# Patient Record
Sex: MALE | Race: BLACK OR AFRICAN AMERICAN | NOT HISPANIC OR LATINO | Employment: FULL TIME | URBAN - METROPOLITAN AREA
[De-identification: names, ages, dates, MRNs, and addresses within clinical notes are randomized per-mention and may not be internally consistent; named-entity substitution may affect disease eponyms.]

---

## 2017-09-12 ENCOUNTER — OFFICE VISIT (OUTPATIENT)
Dept: SPINE | Facility: CLINIC | Age: 37
End: 2017-09-12
Payer: COMMERCIAL

## 2017-09-12 VITALS
HEART RATE: 75 BPM | DIASTOLIC BLOOD PRESSURE: 81 MMHG | SYSTOLIC BLOOD PRESSURE: 140 MMHG | WEIGHT: 252.13 LBS | HEIGHT: 71 IN | BODY MASS INDEX: 35.3 KG/M2

## 2017-09-12 DIAGNOSIS — M54.41 CHRONIC BILATERAL LOW BACK PAIN WITH BILATERAL SCIATICA: Primary | ICD-10-CM

## 2017-09-12 DIAGNOSIS — G89.29 CHRONIC BILATERAL LOW BACK PAIN WITH BILATERAL SCIATICA: Primary | ICD-10-CM

## 2017-09-12 DIAGNOSIS — M54.42 CHRONIC BILATERAL LOW BACK PAIN WITH BILATERAL SCIATICA: Primary | ICD-10-CM

## 2017-09-12 DIAGNOSIS — M51.26 HERNIATED LUMBAR INTERVERTEBRAL DISC: ICD-10-CM

## 2017-09-12 DIAGNOSIS — E66.9 OBESITY, UNSPECIFIED OBESITY SEVERITY, UNSPECIFIED OBESITY TYPE: ICD-10-CM

## 2017-09-12 PROCEDURE — 3008F BODY MASS INDEX DOCD: CPT | Mod: S$GLB,,, | Performed by: PHYSICIAN ASSISTANT

## 2017-09-12 PROCEDURE — 99999 PR PBB SHADOW E&M-NEW PATIENT-LVL III: CPT | Mod: PBBFAC,,, | Performed by: PHYSICIAN ASSISTANT

## 2017-09-12 PROCEDURE — 99204 OFFICE O/P NEW MOD 45 MIN: CPT | Mod: S$GLB,,, | Performed by: PHYSICIAN ASSISTANT

## 2017-09-12 RX ORDER — LISINOPRIL AND HYDROCHLOROTHIAZIDE 10; 12.5 MG/1; MG/1
TABLET ORAL
COMMUNITY
Start: 2017-09-06 | End: 2017-10-04
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
## 2017-10-04 ENCOUNTER — OFFICE VISIT (OUTPATIENT)
Dept: PAIN MEDICINE | Facility: CLINIC | Age: 37
End: 2017-10-04
Payer: COMMERCIAL

## 2017-10-04 VITALS
BODY MASS INDEX: 34.57 KG/M2 | DIASTOLIC BLOOD PRESSURE: 68 MMHG | HEART RATE: 86 BPM | RESPIRATION RATE: 20 BRPM | SYSTOLIC BLOOD PRESSURE: 112 MMHG | WEIGHT: 246.94 LBS | HEIGHT: 71 IN

## 2017-10-04 DIAGNOSIS — M53.3 SACROILIAC DYSFUNCTION: ICD-10-CM

## 2017-10-04 DIAGNOSIS — M47.27 OSTEOARTHRITIS OF SPINE WITH RADICULOPATHY, LUMBOSACRAL REGION: ICD-10-CM

## 2017-10-04 DIAGNOSIS — M54.16 LUMBAR RADICULOPATHY: Primary | ICD-10-CM

## 2017-10-04 DIAGNOSIS — E66.9 OBESITY (BMI 30-39.9): ICD-10-CM

## 2017-10-04 DIAGNOSIS — M51.36 DDD (DEGENERATIVE DISC DISEASE), LUMBAR: ICD-10-CM

## 2017-10-04 PROBLEM — M51.369 DDD (DEGENERATIVE DISC DISEASE), LUMBAR: Status: ACTIVE | Noted: 2017-10-04

## 2017-10-04 PROCEDURE — 99244 OFF/OP CNSLTJ NEW/EST MOD 40: CPT | Mod: S$GLB,,, | Performed by: PAIN MEDICINE

## 2017-10-04 PROCEDURE — 99999 PR PBB SHADOW E&M-EST. PATIENT-LVL III: CPT | Mod: PBBFAC,,, | Performed by: PAIN MEDICINE

## 2017-10-04 RX ORDER — TIZANIDINE 2 MG/1
4 TABLET ORAL NIGHTLY PRN
Qty: 60 TABLET | Refills: 1 | Status: SHIPPED | OUTPATIENT
Start: 2017-10-04 | End: 2017-10-14

## 2017-10-04 RX ORDER — GABAPENTIN 300 MG/1
CAPSULE ORAL
Qty: 90 CAPSULE | Refills: 1 | Status: SHIPPED | OUTPATIENT
Start: 2017-10-04 | End: 2021-12-16 | Stop reason: SDUPTHER

## 2017-10-04 RX ORDER — LOSARTAN POTASSIUM AND HYDROCHLOROTHIAZIDE 25; 100 MG/1; MG/1
0.5 TABLET ORAL DAILY
Refills: 1 | COMMUNITY
Start: 2017-09-12 | End: 2023-06-07 | Stop reason: SDUPTHER

## 2017-10-04 NOTE — PATIENT INSTRUCTIONS
Understanding Lumbar Radiculopathy    Lumbar radiculopathy is irritation or inflammation of a nerve root in the low back. It causes symptoms that spread out from the back down one or both legs. To understand this condition, it helps to understand the parts of the spine:  · Vertebrae. These are bones that stack to form the spine. The lumbar spine contains the 5 bottom vertebrae.  · Disks. These are soft pads of tissue between the vertebrae. They act as shock absorbers for the spine.  · Spinal canal. This is a tunnel formed within the stacked vertebrae. In the lumbar spine, nerves run through this canal.  · Nerves. These branch off and leave the spinal canal, traveling out to parts of the body. As they leave the spinal canal, nerves pass through openings between the vertebrae. The nerve root is the part of the nerve that is closest to the spinal canal.  · Sciatic nerve. This is a large nerve formed from several nerve roots in the low back. This nerve extends down the back of the leg to the foot.  With lumbar radiculopathy, nerve roots in the low back become irritated. This leads to pain and symptoms. The sciatic nerve is commonly involved, so the condition is often called sciatica.  What causes lumbar radiculopathy?  Aging, injury, poor posture, extra body weight, and other issues can lead to problems in the low back. These problems may then irritate nerve roots. They include:  · Damage to a disk in the lumbar spine. The damaged disk may then press on nearby nerve roots.  · Degeneration from wear and tear, and aging. This can lead to narrowing (stenosis) of the openings between the vertebrae. The narrowed openings press on nerve roots as they leave the spinal canal.  · Unstable spine. This is when a vertebra slips forward. It can then press on a nerve root.  Other, less common things can put pressure on nerves in the low back. These include diabetes, infection, or a tumor.  Symptoms of lumbar radiculopathy  These  include:  · Pain in the low back  · Pain, numbness, tingling, or weakness that travels into the buttocks, hip, groin, or leg  · Muscle spasms  Treatment for lumbar radiculopathy  In most cases, your healthcare provider will first try treatments that help relieve symptoms. These may include:  · Prescription and over-the-counter pain medicines. These help relieve pain, swelling, and irritation.  · Limits on positions and activities that increase pain. But lying in bed or avoiding all movement is only recommended for a short period of time.  · Physical therapy, including exercises and stretches. This helps decrease pain and increase movement and function.  · Steroid shots into the lower back. This may help relieve symptoms for a time.  · Weight-loss program. If you are overweight, losing extra pounds may help relieve symptoms.  In some cases, you may need surgery to fix the underlying problem. This depends on the cause, the symptoms, and how long the pain has lasted.  Possible complications  Over time, an irritated and inflamed nerve may become damaged. This may lead to long-lasting (permanent) numbness or weakness in your legs and feet. If symptoms change suddenly or get worse, be sure to let your healthcare provider know.  When to call your healthcare provider  Call your healthcare provider right away if you have any of these:  · New pain or pain that gets worse  · New or increasing weakness, tingling, or numbness in your leg or foot  · Problems controlling your bladder or bowel   Date Last Reviewed: 3/10/2016  © 9786-7188 [employer]. [address] [place] [address], [place] [address]. All rights reserved. This information is not intended as a substitute for professional medical care. Always follow your healthcare professional's instructions.        Lumbar Epidural Injection: Recovery at Home    After a lumbar epidural injection, you dont need to stay in bed when you get home. In fact, its best to walk around  if you feel up to it. Just be careful about being too active. Even if you feel better right away, avoid activities that may strain your back. And follow up on all treatment with your doctor.  What to know about pain relief  Keep in mind that some people feel more pain at first. It usually goes away within a few days. You may also have headaches or trouble sleeping, but if these symptoms are severe, you should call your doctor right away. These should also go away within a few days. In general:  · An injection to reduce inflammation takes a few days to work, sometimes even up to a week. There may even be more pain at first.  · An injection to help locate the source of pain may give only brief pain relief. Later, youll feel the same as you did before the injection.  Tips for recovery  Whether you were injected for pain relief or diagnosis, these tips will help you recover:  · Take walks when you feel up to it.  · Rest if needed, but get up and move around after sitting for half an hour.  · Dont exercise vigorously.  · Dont drive the day of the procedure or until your doctor says its OK.  · Return to work or other activities when your doctor says youre ready.  When to call your doctor  Call right away if you notice any of the following symptoms:  · Severe pain or headache  · Loss of bladder or bowel control  · Fever or chills  · Redness or swelling around the injection site       Date Last Reviewed: 11/1/2015  © 2371-9021 [employer]. [address] [place] [address], [place] [address]. All rights reserved. This information is not intended as a substitute for professional medical care. Always follow your healthcare professional's instructions.
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
## 2017-10-04 NOTE — LETTER
October 4, 2017      Skyla Shah PA-C  [address] [facility] [address]  [address]  [facility] [address]           [facility]  [address] [facility] [address] [place] [address]  Phone: [phone]          Patient: Lane Campbell   MR Number: [id number]   YOB: 1980   Date of Visit: 10/4/2017       Dear Skyla Shah:    Thank you for referring Lane Campbell to me for evaluation. Attached you will find relevant portions of my assessment and plan of care.    If you have questions, please do not hesitate to call me. I look forward to following Lane Campbell along with you.    Sincerely,    Kassie Mistry Jr., MD    Enclosure  CC:  No Recipients    If you would like to receive this communication electronically, please contact [email or web address] or [phone] to request more information on [facility] Link access.    For providers and/or their staff who would like to refer a patient to [facility], please contact us through our one-stop-shop provider referral line, Janina Fisher, at [phone].    If you feel you have received this communication in error or would no longer like to receive these types of communications, please e-mail [email or web address]

## 2017-10-10 ENCOUNTER — TELEPHONE (OUTPATIENT)
Dept: PAIN MEDICINE | Facility: CLINIC | Age: 37
End: 2017-10-10

## 2017-10-10 NOTE — TELEPHONE ENCOUNTER
Spoke to billing at [facility] and outpatient surgery scheduling at [facility], all of whom have been unable to contact patient about his procedure tomorrow 10/11/17 and payment intentions. After discussing this with Dr Mistry this procedure has been postponed and we will attempt to proceed with the scheduled procedure on 10/25, pending response from patient.

## 2017-10-10 NOTE — TELEPHONE ENCOUNTER
Called patient concerning his procedure for tomorrow to inform him the surgery center is trying to get in touch with him. No answer, Called his mother and she states she will try and get in touch with him this afternoon.

## 2019-10-08 ENCOUNTER — TELEPHONE (OUTPATIENT)
Dept: NEUROSURGERY | Facility: CLINIC | Age: 39
End: 2019-10-08

## 2019-10-08 NOTE — TELEPHONE ENCOUNTER
Called both patient and mother regarding rescheduled appointment. No answer. Unable to leave VM with patient. LVM with mother.

## 2019-11-06 ENCOUNTER — OFFICE VISIT (OUTPATIENT)
Dept: NEUROSURGERY | Facility: CLINIC | Age: 39
End: 2019-11-06
Payer: COMMERCIAL

## 2019-11-06 ENCOUNTER — TELEPHONE (OUTPATIENT)
Dept: PAIN MEDICINE | Facility: HOSPITAL | Age: 39
End: 2019-11-06

## 2019-11-06 VITALS
DIASTOLIC BLOOD PRESSURE: 101 MMHG | SYSTOLIC BLOOD PRESSURE: 156 MMHG | BODY MASS INDEX: 38.4 KG/M2 | WEIGHT: 274.25 LBS | HEART RATE: 77 BPM | HEIGHT: 71 IN

## 2019-11-06 DIAGNOSIS — M51.26 LUMBAR DISC HERNIATION: Primary | ICD-10-CM

## 2019-11-06 DIAGNOSIS — M54.50 LOW BACK PAIN, NON-SPECIFIC: ICD-10-CM

## 2019-11-06 DIAGNOSIS — M54.16 RADICULOPATHY OF LUMBAR REGION: ICD-10-CM

## 2019-11-06 PROCEDURE — 99214 OFFICE O/P EST MOD 30 MIN: CPT | Mod: S$GLB,,, | Performed by: PHYSICIAN ASSISTANT

## 2019-11-06 PROCEDURE — 99999 PR PBB SHADOW E&M-EST. PATIENT-LVL III: CPT | Mod: PBBFAC,,, | Performed by: PHYSICIAN ASSISTANT

## 2019-11-06 PROCEDURE — 99214 PR OFFICE/OUTPT VISIT, EST, LEVL IV, 30-39 MIN: ICD-10-PCS | Mod: S$GLB,,, | Performed by: PHYSICIAN ASSISTANT

## 2019-11-06 PROCEDURE — 3008F PR BODY MASS INDEX (BMI) DOCUMENTED: ICD-10-PCS | Mod: CPTII,S$GLB,, | Performed by: PHYSICIAN ASSISTANT

## 2019-11-06 PROCEDURE — 99999 PR PBB SHADOW E&M-EST. PATIENT-LVL III: ICD-10-PCS | Mod: PBBFAC,,, | Performed by: PHYSICIAN ASSISTANT

## 2019-11-06 PROCEDURE — 3008F BODY MASS INDEX DOCD: CPT | Mod: CPTII,S$GLB,, | Performed by: PHYSICIAN ASSISTANT

## 2019-11-06 NOTE — TELEPHONE ENCOUNTER
----- Message from Yamileth Reese PA-C sent at 11/6/2019 12:00 PM CST -----  Hello,    This nice gentleman saw Dr. Aceves and I today. He has a large L4/L5 disc that may need surgery, but only mild weakness on exam. We are repeating his MRI and going to see him in a month, but could you schedule him for an L4-L5 translaminar (bilateral) MAXIMUS with 1st available please?     He is also very anxious about the needles. I discussed that there will be some sedative, but if you think it would be better for me to call in something for him ahead of time, please let me know. I just didn't want him to be over sedated.     Thanks so much! Please let me know if you need anything on our end  -Linda
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
## 2019-11-06 NOTE — TELEPHONE ENCOUNTER
Please set up patient for bilateral L4/5 TFESI ASAP and place orders for IV, follow up 2 weeks after procedure. Thanks!

## 2019-11-08 DIAGNOSIS — M54.16 LUMBAR RADICULOPATHY: Primary | ICD-10-CM

## 2019-11-08 RX ORDER — ALPRAZOLAM 0.5 MG/1
1 TABLET, ORALLY DISINTEGRATING ORAL ONCE AS NEEDED
Status: CANCELLED | OUTPATIENT
Start: 2019-11-13 | End: 2031-04-10

## 2019-11-08 NOTE — TELEPHONE ENCOUNTER
Spoke with patient and the TF MAXIMUS has been scheduled for 11/13. Pre-op instructions were reviewed and sent to patient.

## 2019-11-11 ENCOUNTER — TELEPHONE (OUTPATIENT)
Dept: NEUROSURGERY | Facility: CLINIC | Age: 39
End: 2019-11-11

## 2019-11-11 NOTE — TELEPHONE ENCOUNTER
Pt called our office asking to be rescheduled for injection scheduled 11/13/19. He says he is not feeling well and has body aches. Please return call. Thanks!

## 2019-11-12 ENCOUNTER — TELEPHONE (OUTPATIENT)
Dept: SURGERY | Facility: HOSPITAL | Age: 39
End: 2019-11-12

## 2019-11-12 NOTE — TELEPHONE ENCOUNTER
While attempting to do pre-op phone call, patient states he already called to cancel procedure scheduled for 11/13/19, due to feeling under the weather. Patient removed from schedule, please call to reschedule.

## 2019-11-25 ENCOUNTER — TELEPHONE (OUTPATIENT)
Dept: PAIN MEDICINE | Facility: CLINIC | Age: 39
End: 2019-11-25

## 2019-11-25 NOTE — TELEPHONE ENCOUNTER
----- Message from Paris Parker sent at 11/21/2019  9:33 AM CST -----  Contact: [phone]  Patient is returning nurse's phone call.  Please call patient back at [phone].

## 2019-12-06 ENCOUNTER — TELEPHONE (OUTPATIENT)
Dept: NEUROSURGERY | Facility: CLINIC | Age: 39
End: 2019-12-06

## 2019-12-06 NOTE — TELEPHONE ENCOUNTER
Pt called requesting to schedule MAXIMUS with Dr Rodriguez. I told him it was documented that you tried to call but unable to leave voicemail. He said he doesn't have good reception. The best time to call him is around 3:00 or later in the day. Thank you!

## 2019-12-09 ENCOUNTER — TELEPHONE (OUTPATIENT)
Dept: NEUROSURGERY | Facility: CLINIC | Age: 39
End: 2019-12-09

## 2019-12-09 NOTE — TELEPHONE ENCOUNTER
SAMUEL attempted to call pt to inform of appt with pain management scheduled on 12/27/19. No voicemail available.

## 2019-12-27 ENCOUNTER — OFFICE VISIT (OUTPATIENT)
Dept: PAIN MEDICINE | Facility: CLINIC | Age: 39
End: 2019-12-27
Payer: COMMERCIAL

## 2019-12-27 VITALS
SYSTOLIC BLOOD PRESSURE: 141 MMHG | DIASTOLIC BLOOD PRESSURE: 62 MMHG | TEMPERATURE: 97 F | BODY MASS INDEX: 39.28 KG/M2 | WEIGHT: 281.63 LBS | RESPIRATION RATE: 20 BRPM | OXYGEN SATURATION: 99 % | HEART RATE: 82 BPM

## 2019-12-27 DIAGNOSIS — M51.36 DDD (DEGENERATIVE DISC DISEASE), LUMBAR: ICD-10-CM

## 2019-12-27 DIAGNOSIS — M54.16 LUMBAR RADICULOPATHY: Primary | ICD-10-CM

## 2019-12-27 PROCEDURE — 99213 OFFICE O/P EST LOW 20 MIN: CPT | Mod: S$GLB,,, | Performed by: PHYSICIAN ASSISTANT

## 2019-12-27 PROCEDURE — 99213 PR OFFICE/OUTPT VISIT, EST, LEVL III, 20-29 MIN: ICD-10-PCS | Mod: S$GLB,,, | Performed by: PHYSICIAN ASSISTANT

## 2019-12-27 PROCEDURE — 99999 PR PBB SHADOW E&M-EST. PATIENT-LVL IV: ICD-10-PCS | Mod: PBBFAC,,, | Performed by: PHYSICIAN ASSISTANT

## 2019-12-27 PROCEDURE — 99999 PR PBB SHADOW E&M-EST. PATIENT-LVL IV: CPT | Mod: PBBFAC,,, | Performed by: PHYSICIAN ASSISTANT

## 2019-12-27 PROCEDURE — 3008F BODY MASS INDEX DOCD: CPT | Mod: CPTII,S$GLB,, | Performed by: PHYSICIAN ASSISTANT

## 2019-12-27 PROCEDURE — 3008F PR BODY MASS INDEX (BMI) DOCUMENTED: ICD-10-PCS | Mod: CPTII,S$GLB,, | Performed by: PHYSICIAN ASSISTANT

## 2019-12-27 RX ORDER — ALPRAZOLAM 0.5 MG/1
1 TABLET, ORALLY DISINTEGRATING ORAL ONCE AS NEEDED
Status: CANCELLED | OUTPATIENT
Start: 2020-01-17 | End: 2031-06-14

## 2019-12-29 NOTE — PROGRESS NOTES
This note was completed with dictation software and grammatical errors may exist.    CC:  Low back and bilateral leg pain    HPI:  The patient is a 39-year-old male with past medical history significant for hypertension who presents in referral from Yamileth Reese PA-C for low back and bilateral leg pain. The patient is new to me but has seen Dr. Mistry in the past.  This was over 2 years ago and it was recommended that he undergo epidural steroid injections at that time.  However, he has not had this done and has been living with the pain. He has tried physical therapy and medication without significant relief.  The patient describes deep pain across the low back radiating to the bilateral buttocks, lateral thighs, lateral shins and entire right foot.  The left leg pain is worse than the right leg pain.  It is worse with sitting too long and then getting up.  He does have some improvement with hot water.  He denies having any weakness, numbness, bladder or bowel incontinence.    Pain intervention history:  He has tried physical therapy without sufficient relief.  He is taking gabapentin with mild relief.    ROS:  The patient reports back pain.  Balance of review of systems is negative.    Past Medical History:   Diagnosis Date    Diabetes mellitus     pre diabetic    H/O bronchitis     Hypertension        Past Surgical History:   Procedure Laterality Date    FRACTURE SURGERY      left thumb       Social History     Socioeconomic History    Marital status: Single     Spouse name: Not on file    Number of children: Not on file    Years of education: Not on file    Highest education level: Not on file   Occupational History    Not on file   Social Needs    Financial resource strain: Not on file    Food insecurity:     Worry: Not on file     Inability: Not on file    Transportation needs:     Medical: Not on file     Non-medical: Not on file   Tobacco Use    Smoking status: Never Smoker     Smokeless tobacco: Never Used   Substance and Sexual Activity    Alcohol use: Yes     Comment: occaisonal    Drug use: No    Sexual activity: Never   Lifestyle    Physical activity:     Days per week: Not on file     Minutes per session: Not on file    Stress: Not on file   Relationships    Social connections:     Talks on phone: Not on file     Gets together: Not on file     Attends [employer] service: Not on file     Active member of club or organization: Not on file     Attends meetings of clubs or organizations: Not on file     Relationship status: Not on file   Other Topics Concern    Not on file   Social History Narrative    Not on file         Medications/Allergies: See med card    Vitals:    12/27/19 1449   BP: (!) 141/62   Pulse: 82   Resp: 20   Temp: 97.4 °F (36.3 °C)   TempSrc: Oral   SpO2: 99%   Weight: 127.7 kg (281 lb 10.2 oz)   PainSc:   6   PainLoc: Back         Physical exam:  Gen: A and O x3, pleasant, well-groomed  Skin: No rashes or obvious lesions  HEENT: PERRLA, no obvious deformities on ears or in canals. Trachea midline.  CVS: Regular rate and rhythm, normal palpable pulses.  Resp:No increased work of breathing, symmetrical chest rise.  Abdomen: Soft, NT/ND.  Musculoskeletal: Able to heel walk, toe walk. No antalgic gait.     Neuro:  Lower extremities: 5/5 strength bilaterally  Reflexes: Patellar 2+, Achilles 2+ bilaterally.  Sensory:  Intact and symmetrical to light touch and pinprick in L2-S1 dermatomes bilaterally.    Lumbar spine:  Lumbar spine: ROM is full with flexion extension and oblique extension with increased low back pain especially with flexion.   Chino's test causes no increased pain on either side.    Supine straight leg raise causes left greater than right buttock and lateral thigh pain.  Internal and external rotation of the hip causes no increased pain on either side.  Myofascial exam: No tenderness to palpation across lumbar paraspinous  muscles.      Imagin2019 MRI lumbar spine per Neurosurgery notes  There is a large left worse than right disc herniation causing severe left lateral recess and foraminal stenosis at L4-5. There is mild facet arthropathy.    Assessment:  The patient is a 39-year-old male with past medical history significant for hypertension who presents in referral from Yamileth Reese PA-C for low back and bilateral leg pain.     1. Lumbar radiculopathy  Vital signs    Verify informed consent    Notify physician     Notify physician     Notify physician (specify)    Diet NPO    Case Request Operating Room: Injection,steroid,epidural,transforaminal approach L4/5    Place in Outpatient    alprazolam ODT dissolvable tablet 1 mg   2. DDD (degenerative disc disease), lumbar         Plan:  1.  We discussed the patient's MRI findings and his symptoms.  He has seen Neurosurgery and it was recommended that he try MAXIMUS prior to any surgical intervention.  I will schedule patient for bilateral L4/5 transforaminal epidural steroid injections.  This can be repeated if he has some relief but not full.  2.  Follow-up in 4 weeks postprocedure sooner as needed.    Thank you for referring this interesting patient, and I look forward to continuing to collaborate in his care.

## 2019-12-29 NOTE — H&P (VIEW-ONLY)
This note was completed with dictation software and grammatical errors may exist.    CC:  Low back and bilateral leg pain    HPI:  The patient is a 39-year-old male with past medical history significant for hypertension who presents in referral from Yamileth Reese PA-C for low back and bilateral leg pain. The patient is new to me but has seen Dr. Mistry in the past.  This was over 2 years ago and it was recommended that he undergo epidural steroid injections at that time.  However, he has not had this done and has been living with the pain. He has tried physical therapy and medication without significant relief.  The patient describes deep pain across the low back radiating to the bilateral buttocks, lateral thighs, lateral shins and entire right foot.  The left leg pain is worse than the right leg pain.  It is worse with sitting too long and then getting up.  He does have some improvement with hot water.  He denies having any weakness, numbness, bladder or bowel incontinence.    Pain intervention history:  He has tried physical therapy without sufficient relief.  He is taking gabapentin with mild relief.    ROS:  The patient reports back pain.  Balance of review of systems is negative.    Past Medical History:   Diagnosis Date    Diabetes mellitus     pre diabetic    H/O bronchitis     Hypertension        Past Surgical History:   Procedure Laterality Date    FRACTURE SURGERY      left thumb       Social History     Socioeconomic History    Marital status: Single     Spouse name: Not on file    Number of children: Not on file    Years of education: Not on file    Highest education level: Not on file   Occupational History    Not on file   Social Needs    Financial resource strain: Not on file    Food insecurity:     Worry: Not on file     Inability: Not on file    Transportation needs:     Medical: Not on file     Non-medical: Not on file   Tobacco Use    Smoking status: Never Smoker     Smokeless tobacco: Never Used   Substance and Sexual Activity    Alcohol use: Yes     Comment: occaisonal    Drug use: No    Sexual activity: Never   Lifestyle    Physical activity:     Days per week: Not on file     Minutes per session: Not on file    Stress: Not on file   Relationships    Social connections:     Talks on phone: Not on file     Gets together: Not on file     Attends [employer] service: Not on file     Active member of club or organization: Not on file     Attends meetings of clubs or organizations: Not on file     Relationship status: Not on file   Other Topics Concern    Not on file   Social History Narrative    Not on file         Medications/Allergies: See med card    Vitals:    12/27/19 1449   BP: (!) 141/62   Pulse: 82   Resp: 20   Temp: 97.4 °F (36.3 °C)   TempSrc: Oral   SpO2: 99%   Weight: 127.7 kg (281 lb 10.2 oz)   PainSc:   6   PainLoc: Back         Physical exam:  Gen: A and O x3, pleasant, well-groomed  Skin: No rashes or obvious lesions  HEENT: PERRLA, no obvious deformities on ears or in canals. Trachea midline.  CVS: Regular rate and rhythm, normal palpable pulses.  Resp:No increased work of breathing, symmetrical chest rise.  Abdomen: Soft, NT/ND.  Musculoskeletal: Able to heel walk, toe walk. No antalgic gait.     Neuro:  Lower extremities: 5/5 strength bilaterally  Reflexes: Patellar 2+, Achilles 2+ bilaterally.  Sensory:  Intact and symmetrical to light touch and pinprick in L2-S1 dermatomes bilaterally.    Lumbar spine:  Lumbar spine: ROM is full with flexion extension and oblique extension with increased low back pain especially with flexion.   Chino's test causes no increased pain on either side.    Supine straight leg raise causes left greater than right buttock and lateral thigh pain.  Internal and external rotation of the hip causes no increased pain on either side.  Myofascial exam: No tenderness to palpation across lumbar paraspinous  muscles.      Imagin2019 MRI lumbar spine per Neurosurgery notes  There is a large left worse than right disc herniation causing severe left lateral recess and foraminal stenosis at L4-5. There is mild facet arthropathy.    Assessment:  The patient is a 39-year-old male with past medical history significant for hypertension who presents in referral from Yamileth Reese PA-C for low back and bilateral leg pain.     1. Lumbar radiculopathy  Vital signs    Verify informed consent    Notify physician     Notify physician     Notify physician (specify)    Diet NPO    Case Request Operating Room: Injection,steroid,epidural,transforaminal approach L4/5    Place in Outpatient    alprazolam ODT dissolvable tablet 1 mg   2. DDD (degenerative disc disease), lumbar         Plan:  1.  We discussed the patient's MRI findings and his symptoms.  He has seen Neurosurgery and it was recommended that he try MAXIMUS prior to any surgical intervention.  I will schedule patient for bilateral L4/5 transforaminal epidural steroid injections.  This can be repeated if he has some relief but not full.  2.  Follow-up in 4 weeks postprocedure sooner as needed.    Thank you for referring this interesting patient, and I look forward to continuing to collaborate in his care.

## 2020-01-17 ENCOUNTER — HOSPITAL ENCOUNTER (OUTPATIENT)
Dept: RADIOLOGY | Facility: HOSPITAL | Age: 40
Discharge: HOME | End: 2020-01-17
Attending: ANESTHESIOLOGY | Admitting: ANESTHESIOLOGY
Payer: COMMERCIAL

## 2020-01-17 ENCOUNTER — HOSPITAL ENCOUNTER (OUTPATIENT)
Facility: HOSPITAL | Age: 40
Discharge: HOME | End: 2020-01-17
Attending: ANESTHESIOLOGY | Admitting: ANESTHESIOLOGY
Payer: COMMERCIAL

## 2020-01-17 VITALS
OXYGEN SATURATION: 100 % | BODY MASS INDEX: 38.22 KG/M2 | HEART RATE: 70 BPM | SYSTOLIC BLOOD PRESSURE: 130 MMHG | HEIGHT: 71 IN | TEMPERATURE: 97 F | DIASTOLIC BLOOD PRESSURE: 78 MMHG | RESPIRATION RATE: 16 BRPM | WEIGHT: 273 LBS

## 2020-01-17 DIAGNOSIS — M51.36 DDD (DEGENERATIVE DISC DISEASE), LUMBAR: ICD-10-CM

## 2020-01-17 DIAGNOSIS — M54.16 LUMBAR RADICULOPATHY: Primary | ICD-10-CM

## 2020-01-17 PROCEDURE — 76000 FLUOROSCOPY <1 HR PHYS/QHP: CPT | Mod: TC,PO

## 2020-01-17 PROCEDURE — 25500020 PHARM REV CODE 255: Mod: PO | Performed by: ANESTHESIOLOGY

## 2020-01-17 PROCEDURE — 64483 NJX AA&/STRD TFRM EPI L/S 1: CPT | Mod: 50,PO | Performed by: ANESTHESIOLOGY

## 2020-01-17 PROCEDURE — 64483 NJX AA&/STRD TFRM EPI L/S 1: CPT | Mod: 50,,, | Performed by: ANESTHESIOLOGY

## 2020-01-17 PROCEDURE — 64483 PR EPIDURAL INJ, ANES/STEROID, TRANSFORAMINAL, LUMB/SACR, SNGL LEVL: ICD-10-PCS | Mod: 50,,, | Performed by: ANESTHESIOLOGY

## 2020-01-17 PROCEDURE — 25000003 PHARM REV CODE 250: Mod: PO | Performed by: ANESTHESIOLOGY

## 2020-01-17 PROCEDURE — 25000003 PHARM REV CODE 250: Mod: PO | Performed by: PHYSICIAN ASSISTANT

## 2020-01-17 PROCEDURE — 63600175 PHARM REV CODE 636 W HCPCS: Mod: PO | Performed by: ANESTHESIOLOGY

## 2020-01-17 RX ORDER — ALPRAZOLAM 0.5 MG/1
1 TABLET, ORALLY DISINTEGRATING ORAL ONCE AS NEEDED
Status: COMPLETED | OUTPATIENT
Start: 2020-01-17 | End: 2020-01-17

## 2020-01-17 RX ORDER — BUPIVACAINE HYDROCHLORIDE 2.5 MG/ML
INJECTION, SOLUTION EPIDURAL; INFILTRATION; INTRACAUDAL
Status: DISCONTINUED | OUTPATIENT
Start: 2020-01-17 | End: 2020-01-17 | Stop reason: HOSPADM

## 2020-01-17 RX ORDER — LIDOCAINE HYDROCHLORIDE 10 MG/ML
INJECTION, SOLUTION EPIDURAL; INFILTRATION; INTRACAUDAL; PERINEURAL
Status: DISCONTINUED | OUTPATIENT
Start: 2020-01-17 | End: 2020-01-17 | Stop reason: HOSPADM

## 2020-01-17 RX ORDER — METHYLPREDNISOLONE ACETATE 80 MG/ML
INJECTION, SUSPENSION INTRA-ARTICULAR; INTRALESIONAL; INTRAMUSCULAR; SOFT TISSUE
Status: DISCONTINUED | OUTPATIENT
Start: 2020-01-17 | End: 2020-01-17 | Stop reason: HOSPADM

## 2020-01-17 RX ADMIN — ALPRAZOLAM 1 MG: 0.5 TABLET, ORALLY DISINTEGRATING ORAL at 01:01

## 2020-01-17 NOTE — OP NOTE
PROCEDURE DATE: 1/17/2020    PROCEDURE: Bilateral L4/5 transforaminal epidural steroid injection under fluoroscopy    DIAGNOSIS: Lumbar  Radiculopathy    Post op diagnosis: Same    PHYSICIAN: Isaias Rodriguez MD    MEDICATIONS INJECTED:  Methylprednisolone 40mg (0.5ml) and 1.5ml 0.25% bupivicaine at each nerve root.     LOCAL ANESTHETIC INJECTED:  Lidocaine 1%. 4 ml per site.    SEDATION MEDICATIONS: none    ESTIMATED BLOOD LOSS:  none    COMPLICATIONS:  none    TECHNIQUE:   A time-out was taken to identify patient and procedure side prior to starting the procedure. The patient was placed in a prone position, prepped and draped in the usual sterile fashion using ChloraPrep and sterile towels.  The area to be injected was determined under fluoroscopic guidance in AP and oblique view.  Local anesthetic was given by raising a wheal and going down to the hub of a 25-gauge 1.5 inch needle.  In oblique view, a 3.5 inch 22-gauge bent-tip spinal needle was introduced towards 6 oclock position of the pedicle of each above named nerve root level.  The needle was walked medially then hinged into the neural foramen and position was confirmed in AP and lateral views.  Omnipaque contrast dye was injected to confirm appropriate placement and that there was no vascular uptake.  After negative aspiration for blood or CSF, the medication was then injected. This was performed at the right and then left L4/5 level(s). The patient tolerated the procedure well.    The patient was monitored after the procedure.  Patient was given post procedure and discharge instructions to follow at home. The patient was discharged in a stable condition.

## 2020-01-17 NOTE — DISCHARGE SUMMARY
[facility]  Discharge Note  Short Stay    Admit Date: 1/17/2020    Discharge Date: 1/17/2020    Attending Physician: Isaias Rodriguez MD     Discharge Provider: Isaias Rodriguez    Diagnoses:  Active Hospital Problems    Diagnosis  POA    *Lumbar radiculopathy [M54.16]  Yes      Resolved Hospital Problems   No resolved problems to display.       Discharged Condition: good    Final Diagnoses: Lumbar radiculopathy [M54.16]    Disposition: Home or Self Care    Hospital Course: no complications, uneventful    Outcome of Hospitalization, Treatment, Procedure, or Surgery:  Patient was admitted for outpatient procedure. The patient underwent procedure without complications and are discharged home    Follow up/Patient Instructions:  Follow up as scheduled in Pain Management clinic in 3-4 weeks/Patient has received instructions and follow up date and time    Medications:  Continue previous medications    Discharge Procedure Orders   Call MD for:  temperature >100.4     Call MD for:  severe uncontrolled pain     Call MD for:  redness, tenderness, or signs of infection (pain, swelling, redness, odor or green/yellow discharge around incision site)     Call MD for:  severe persistent headache     No dressing needed         Discharge Procedure Orders (must include Diet, Follow-up, Activity):   Discharge Procedure Orders (must include Diet, Follow-up, Activity)   Call MD for:  temperature >100.4     Call MD for:  severe uncontrolled pain     Call MD for:  redness, tenderness, or signs of infection (pain, swelling, redness, odor or green/yellow discharge around incision site)     Call MD for:  severe persistent headache     No dressing needed

## 2020-01-17 NOTE — PLAN OF CARE
Patient tolerating oral liquids without difficulty. No apparent s&s of distress noted at this time, no complaints voiced at this time. Injection site free from redness and drainage.  Discharge instructions reviewed with patient with good verbal feedback received. Patient ready for discharge

## 2020-01-17 NOTE — DISCHARGE INSTRUCTIONS
Home Care Instructions    Apply ice pack to injection site for 20 minute periods for the first 24 hours for soreness/discomfort at injection site  DO NOT USE HEAT FOR 24 HOURS  Keep site clean and dry for 24 hours. If Band-Aid present remove when desired.  Do not drive until tomorrow  Take care when walking after a lumbar injection    STEROIDS or RADIOFREQUENCY  Make take 10-14 days for full affects  Avoid strenuous exercises for 2 days        SEE IMMEDIATE MEDICAL HELP FOR:  Severe increase in your usual pain or appearance of new pain  Prolonged or increasing weakness or numbness in the legs or arms  Drainage, redness, active bleeding, or increased swelling at the injection site  Temperature over 100.0 degrees F.  Headache that increases when your head is upright and decrease when you lie flat    CALL 911 OR GO DIRECTLY TO EMERGENCY DEPARTMENT FOR:  Shortness of breath, chest pain, or problems breathing

## 2020-02-07 ENCOUNTER — OFFICE VISIT (OUTPATIENT)
Dept: PAIN MEDICINE | Facility: CLINIC | Age: 40
End: 2020-02-07
Payer: COMMERCIAL

## 2020-02-07 VITALS
BODY MASS INDEX: 38.33 KG/M2 | HEART RATE: 88 BPM | DIASTOLIC BLOOD PRESSURE: 60 MMHG | WEIGHT: 274.81 LBS | TEMPERATURE: 98 F | RESPIRATION RATE: 20 BRPM | OXYGEN SATURATION: 96 % | SYSTOLIC BLOOD PRESSURE: 125 MMHG

## 2020-02-07 DIAGNOSIS — M51.36 DDD (DEGENERATIVE DISC DISEASE), LUMBAR: ICD-10-CM

## 2020-02-07 DIAGNOSIS — M54.16 LUMBAR RADICULOPATHY: Primary | ICD-10-CM

## 2020-02-07 PROCEDURE — 3008F PR BODY MASS INDEX (BMI) DOCUMENTED: ICD-10-PCS | Mod: CPTII,S$GLB,, | Performed by: PHYSICIAN ASSISTANT

## 2020-02-07 PROCEDURE — 99999 PR PBB SHADOW E&M-EST. PATIENT-LVL III: CPT | Mod: PBBFAC,,, | Performed by: PHYSICIAN ASSISTANT

## 2020-02-07 PROCEDURE — 99999 PR PBB SHADOW E&M-EST. PATIENT-LVL III: ICD-10-PCS | Mod: PBBFAC,,, | Performed by: PHYSICIAN ASSISTANT

## 2020-02-07 PROCEDURE — 99213 PR OFFICE/OUTPT VISIT, EST, LEVL III, 20-29 MIN: ICD-10-PCS | Mod: S$GLB,,, | Performed by: PHYSICIAN ASSISTANT

## 2020-02-07 PROCEDURE — 99213 OFFICE O/P EST LOW 20 MIN: CPT | Mod: S$GLB,,, | Performed by: PHYSICIAN ASSISTANT

## 2020-02-07 PROCEDURE — 3008F BODY MASS INDEX DOCD: CPT | Mod: CPTII,S$GLB,, | Performed by: PHYSICIAN ASSISTANT

## 2020-02-07 RX ORDER — ETODOLAC 300 MG/1
300 CAPSULE ORAL 2 TIMES DAILY PRN
Qty: 60 CAPSULE | Refills: 0 | Status: SHIPPED | OUTPATIENT
Start: 2020-02-07 | End: 2021-02-06

## 2020-02-07 NOTE — PROGRESS NOTES
This note was completed with dictation software and grammatical errors may exist.    CC:  Low back and bilateral leg pain    HPI:  The patient is a 39-year-old male with past medical history significant for hypertension who presents in referral from Yamileth Reese PA-C for low back and bilateral leg pain. He is status post bilateral L4/5 transforaminal epidural steroid injections on 01/17/2020 with only 2 days of relief.  He complains of pain across the low back radiating to the buttocks, lateral thighs, shins and tops of his feet.  Pain is worse with prolonged sitting and getting up.  He denies weakness, numbness, bladder or bowel incontinence.    Pain intervention history:  He has tried physical therapy without sufficient relief.  He is taking gabapentin with mild relief.He is status post bilateral L4/5 transforaminal epidural steroid injections on 01/17/2020 with only 2 days of relief.    ROS:  The patient reports back pain.  Balance of review of systems is negative.    Past Medical History:   Diagnosis Date    Diabetes mellitus     pre diabetic    H/O bronchitis     Hypertension        Past Surgical History:   Procedure Laterality Date    FRACTURE SURGERY      left thumb    TRANSFORAMINAL EPIDURAL INJECTION OF STEROID Bilateral 1/17/2020    Procedure: Injection,steroid,epidural,transforaminal approach L4/5;  Surgeon: Isaias Rodriguez MD;  Location: [facility] OR;  Service: Pain Management;  Laterality: Bilateral;       Social History     Socioeconomic History    Marital status: Single     Spouse name: Not on file    Number of children: Not on file    Years of education: Not on file    Highest education level: Not on file   Occupational History    Not on file   Social Needs    Financial resource strain: Not on file    Food insecurity:     Worry: Not on file     Inability: Not on file    Transportation needs:     Medical: Not on file     Non-medical: Not on file   Tobacco Use    Smoking status:  Never Smoker    Smokeless tobacco: Never Used   Substance and Sexual Activity    Alcohol use: Yes     Comment: occaisonal    Drug use: No    Sexual activity: Never   Lifestyle    Physical activity:     Days per week: Not on file     Minutes per session: Not on file    Stress: Not on file   Relationships    Social connections:     Talks on phone: Not on file     Gets together: Not on file     Attends Church service: Not on file     Active member of club or organization: Not on file     Attends meetings of clubs or organizations: Not on file     Relationship status: Not on file   Other Topics Concern    Not on file   Social History Narrative    Not on file         Medications/Allergies: See med card    Vitals:    02/07/20 0951   BP: 125/60   Pulse: 88   Resp: 20   Temp: 97.6 °F (36.4 °C)   TempSrc: Oral   SpO2: 96%   Weight: 124.6 kg (274 lb 12.9 oz)   PainSc:   6   PainLoc: Back         Physical exam:  Gen: A and O x3, pleasant, well-groomed  Skin: No rashes or obvious lesions  HEENT: PERRLA, no obvious deformities on ears or in canals. Trachea midline.  CVS: Regular rate and rhythm, normal palpable pulses.  Resp:No increased work of breathing, symmetrical chest rise.  Abdomen: Soft, NT/ND.  Musculoskeletal: Able to heel walk, toe walk. No antalgic gait.     Neuro:  Lower extremities: 5/5 strength bilaterally  Reflexes: Patellar 2+, Achilles 2+ bilaterally.  Sensory:  Intact and symmetrical to light touch and pinprick in L2-S1 dermatomes bilaterally.    Lumbar spine:  Lumbar spine: ROM is full with flexion extension and oblique extension with increased low back pain especially with flexion.   Chino's test causes no increased pain on either side.    Supine straight leg raise causes left greater than right buttock and lateral thigh pain.  Internal and external rotation of the hip causes no increased pain on either side.  Myofascial exam: No tenderness to palpation across lumbar paraspinous  muscles.      Imagin2019 MRI lumbar spine per Neurosurgery notes  There is a large left worse than right disc herniation causing severe left lateral recess and foraminal stenosis at L4-5. There is mild facet arthropathy.    Assessment:  The patient is a 39-year-old male with past medical history significant for hypertension who presents in referral from Yamileth Reese PA-C for low back and bilateral leg pain.     1. Lumbar radiculopathy     2. DDD (degenerative disc disease), lumbar         Plan:  1.  The patient did not have relief following the bilateral L4/5 transforaminal epidural steroid injections so we discussed trying a different approach.  I will schedule him for an L5/S1 interlaminar epidural steroid injection.  If he does not have relief with this I will have him follow up with Neurosurgery to discuss possible more definitive treatment.  2.  I provided a prescription for Lodine.  He reports feeling Relafen in the past.  We discussed the risks of NSAIDs any verbalizes understanding.  3.  Follow-up in 4 weeks postprocedure or sooner as needed.

## 2020-02-19 ENCOUNTER — TELEPHONE (OUTPATIENT)
Dept: PAIN MEDICINE | Facility: CLINIC | Age: 40
End: 2020-02-19

## 2020-02-19 NOTE — TELEPHONE ENCOUNTER
----- Message from Elba Narvaez sent at 2/19/2020  9:58 AM CST -----  Type: Needs to schedule procedure    Who Called:  Patient  Best Call Back Number: [phone]  Additional Information: Patient needs to schedule injection with doctor/please call patient back to schedule or advise. (Requesting a call back after 3:00)

## 2020-02-20 DIAGNOSIS — M54.16 LUMBAR RADICULOPATHY: Primary | ICD-10-CM

## 2020-02-26 RX ORDER — ALPRAZOLAM 0.5 MG/1
1 TABLET, ORALLY DISINTEGRATING ORAL ONCE AS NEEDED
Status: CANCELLED | OUTPATIENT
Start: 2020-03-13 | End: 2031-08-09

## 2020-03-09 ENCOUNTER — TELEPHONE (OUTPATIENT)
Dept: PAIN MEDICINE | Facility: CLINIC | Age: 40
End: 2020-03-09

## 2020-03-10 NOTE — TELEPHONE ENCOUNTER
----- Message from Dianelys Castañeda sent at 3/9/2020  8:43 AM CDT -----  Contact: Pt  Type: Needs Medical Advice    Who Called:  Pt  Best Call Back Number: [phone]  Additional Information: Requesting a call back regarding pt  Procedure injection . Pt has some questions regarding it. Pt stated where he is there is no reception if the nurse can please call back after 3pm  Please Advise ---Thank you

## 2020-03-20 ENCOUNTER — TELEPHONE (OUTPATIENT)
Dept: PAIN MEDICINE | Facility: CLINIC | Age: 40
End: 2020-03-20

## 2020-04-01 ENCOUNTER — TELEPHONE (OUTPATIENT)
Dept: PAIN MEDICINE | Facility: CLINIC | Age: 40
End: 2020-04-01

## 2020-04-01 NOTE — TELEPHONE ENCOUNTER
----- Message from Faye Weber sent at 4/1/2020  2:45 PM CDT -----  Contact: Lane Campbell  Good afternoon! Mr. Campbell wanted a call back. I believe he wanted to go about scheduling a follow-up visit or virtual appointment. He has two good call back numbers: [phone] and [phone]    Thank you

## 2020-04-01 NOTE — TELEPHONE ENCOUNTER
Patient asked about scheduling a procedure. Informed patient that he will be put on a list of patients to call back once we are able to schedule procedures.

## 2020-05-05 ENCOUNTER — TELEPHONE (OUTPATIENT)
Dept: PAIN MEDICINE | Facility: CLINIC | Age: 40
End: 2020-05-05

## 2020-05-05 NOTE — TELEPHONE ENCOUNTER
----- Message from Ryan Valladares sent at 5/5/2020  1:07 PM CDT -----  Contact: pt  Pt would like a call back to schedule an injection appt    Pt can be reached at [phone]

## 2020-05-07 ENCOUNTER — TELEPHONE (OUTPATIENT)
Dept: PAIN MEDICINE | Facility: CLINIC | Age: 40
End: 2020-05-07

## 2020-05-07 DIAGNOSIS — Z11.9 ENCOUNTER FOR SCREENING FOR INFECTIOUS AND PARASITIC DISEASES, UNSPECIFIED: Primary | ICD-10-CM

## 2020-05-07 NOTE — TELEPHONE ENCOUNTER
----- Message from Lou Ng sent at 5/7/2020  9:50 AM CDT -----  Contact: self  Type:  Patient Returning Call    Who Called:  patient  Who Left Message for Patient:  Sasha  Does the patient know what this is regarding?:  scheduling  Best Call Back Number:  [phone] (home)   Additional Information:  gustavo

## 2020-05-13 ENCOUNTER — LAB VISIT (OUTPATIENT)
Dept: FAMILY MEDICINE | Facility: CLINIC | Age: 40
End: 2020-05-13
Payer: COMMERCIAL

## 2020-05-13 DIAGNOSIS — Z11.9 ENCOUNTER FOR SCREENING FOR INFECTIOUS AND PARASITIC DISEASES, UNSPECIFIED: ICD-10-CM

## 2020-05-13 PROCEDURE — U0002 COVID-19 LAB TEST NON-CDC: HCPCS

## 2020-05-14 LAB — SARS-COV-2 RNA RESP QL NAA+PROBE: NOT DETECTED

## 2020-05-15 ENCOUNTER — HOSPITAL ENCOUNTER (OUTPATIENT)
Facility: HOSPITAL | Age: 40
Discharge: HOME | End: 2020-05-15
Attending: ANESTHESIOLOGY | Admitting: ANESTHESIOLOGY
Payer: COMMERCIAL

## 2020-05-15 ENCOUNTER — HOSPITAL ENCOUNTER (OUTPATIENT)
Dept: RADIOLOGY | Facility: HOSPITAL | Age: 40
Discharge: HOME | End: 2020-05-15
Attending: ANESTHESIOLOGY | Admitting: ANESTHESIOLOGY
Payer: COMMERCIAL

## 2020-05-15 VITALS
TEMPERATURE: 98 F | WEIGHT: 265 LBS | BODY MASS INDEX: 35.89 KG/M2 | OXYGEN SATURATION: 99 % | HEART RATE: 73 BPM | DIASTOLIC BLOOD PRESSURE: 71 MMHG | HEIGHT: 72 IN | SYSTOLIC BLOOD PRESSURE: 141 MMHG | RESPIRATION RATE: 16 BRPM

## 2020-05-15 DIAGNOSIS — M54.16 LUMBAR RADICULOPATHY: Primary | ICD-10-CM

## 2020-05-15 DIAGNOSIS — M51.36 DDD (DEGENERATIVE DISC DISEASE), LUMBAR: ICD-10-CM

## 2020-05-15 PROCEDURE — 62323 NJX INTERLAMINAR LMBR/SAC: CPT | Mod: PO | Performed by: ANESTHESIOLOGY

## 2020-05-15 PROCEDURE — 62323 NJX INTERLAMINAR LMBR/SAC: CPT | Mod: ,,, | Performed by: ANESTHESIOLOGY

## 2020-05-15 PROCEDURE — 25000003 PHARM REV CODE 250: Mod: PO | Performed by: ANESTHESIOLOGY

## 2020-05-15 PROCEDURE — 25500020 PHARM REV CODE 255: Mod: PO | Performed by: ANESTHESIOLOGY

## 2020-05-15 PROCEDURE — 76000 FLUOROSCOPY <1 HR PHYS/QHP: CPT | Mod: TC,PO

## 2020-05-15 PROCEDURE — A4216 STERILE WATER/SALINE, 10 ML: HCPCS | Mod: PO | Performed by: ANESTHESIOLOGY

## 2020-05-15 PROCEDURE — 63600175 PHARM REV CODE 636 W HCPCS: Mod: PO | Performed by: ANESTHESIOLOGY

## 2020-05-15 PROCEDURE — 62323 PR INJ LUMBAR/SACRAL, W/IMAGING GUIDANCE: ICD-10-PCS | Mod: ,,, | Performed by: ANESTHESIOLOGY

## 2020-05-15 RX ORDER — SODIUM CHLORIDE 9 MG/ML
INJECTION, SOLUTION INTRAMUSCULAR; INTRAVENOUS; SUBCUTANEOUS
Status: DISCONTINUED | OUTPATIENT
Start: 2020-05-15 | End: 2020-05-15 | Stop reason: HOSPADM

## 2020-05-15 RX ORDER — LIDOCAINE HYDROCHLORIDE 10 MG/ML
INJECTION, SOLUTION EPIDURAL; INFILTRATION; INTRACAUDAL; PERINEURAL
Status: DISCONTINUED | OUTPATIENT
Start: 2020-05-15 | End: 2020-05-15 | Stop reason: HOSPADM

## 2020-05-15 RX ORDER — METHYLPREDNISOLONE ACETATE 80 MG/ML
INJECTION, SUSPENSION INTRA-ARTICULAR; INTRALESIONAL; INTRAMUSCULAR; SOFT TISSUE
Status: DISCONTINUED | OUTPATIENT
Start: 2020-05-15 | End: 2020-05-15 | Stop reason: HOSPADM

## 2020-05-15 RX ORDER — ALPRAZOLAM 0.5 MG/1
1 TABLET, ORALLY DISINTEGRATING ORAL ONCE AS NEEDED
Status: COMPLETED | OUTPATIENT
Start: 2020-05-15 | End: 2020-05-15

## 2020-05-15 RX ADMIN — ALPRAZOLAM 1 MG: 0.5 TABLET, ORALLY DISINTEGRATING ORAL at 01:05

## 2020-05-15 NOTE — OP NOTE

## 2020-05-15 NOTE — DISCHARGE INSTRUCTIONS
Home Care Instructions    Apply ice pack to injection site for 20 minute periods for the first 24 hours for soreness/discomfort at injection site  DO NOT USE HEAT FOR 24 HOURS  Keep site clean and dry for 24 hours. If Band-Aid present remove when desired.  Do not drive until tomorrow  Take care when walking after a lumbar injection    STEROIDS or RADIOFREQUENCY  Make take 10-14 days for full affects  Avoid strenuous exercises for 2 days      Resume home medications as prescribes today  Resume Aspirin, Plavix or Coumadin the day after the procedure unless otherwise intructed    SEE IMMEDIATE MEDICAL HELP FOR:  Severe increase in your usual pain or appearance of new pain  Prolonged or increasing weakness or numbness in the legs or arms  Drainage, redness, active bleeding, or increased swelling at the injection site  Temperature over 100.0 degrees F.  Headache that increases when your head is upright and decrease when you lie flat    CALL 911 OR GO DIRECTLY TO EMERGENCY DEPARTMENT FOR:  Shortness of breath, chest pain, or problems breathing

## 2020-05-15 NOTE — DISCHARGE SUMMARY
[facility]  Discharge Note  Short Stay    Admit Date: 5/15/2020    Discharge Date: 5/15/2020    Attending Physician: Isaias Rodriguez MD     Discharge Provider: Isaias Rodriguez    Diagnoses:  Active Hospital Problems    Diagnosis  POA    Lumbar radiculopathy [M54.16]  Yes      Resolved Hospital Problems   No resolved problems to display.       Discharged Condition: good    Final Diagnoses: Lumbar radiculopathy [M54.16]    Disposition: Home or Self Care    Hospital Course: no complications, uneventful    Outcome of Hospitalization, Treatment, Procedure, or Surgery:  Patient was admitted for outpatient procedure. The patient underwent procedure without complications and are discharged home    Follow up/Patient Instructions:  Follow up as scheduled in Pain Management clinic in 3-4 weeks/Patient has received instructions and follow up date and time    Medications:  Continue previous medications    Discharge Procedure Orders   Call MD for:  temperature >100.4     Call MD for:  severe uncontrolled pain     Call MD for:  redness, tenderness, or signs of infection (pain, swelling, redness, odor or green/yellow discharge around incision site)     Call MD for:  severe persistent headache     No dressing needed         Discharge Procedure Orders (must include Diet, Follow-up, Activity):   Discharge Procedure Orders (must include Diet, Follow-up, Activity)   Call MD for:  temperature >100.4     Call MD for:  severe uncontrolled pain     Call MD for:  redness, tenderness, or signs of infection (pain, swelling, redness, odor or green/yellow discharge around incision site)     Call MD for:  severe persistent headache     No dressing needed

## 2020-05-15 NOTE — H&P
CC: Back pain    HPI: The patient is a 40yo man with a history of lumbar radiculopathy here for L5/S1 MAXIMUS. There are no major changes in history and physical from 2/7/20.    Past Medical History:   Diagnosis Date    Diabetes mellitus     pre diabetic    H/O bronchitis     Hypertension        Past Surgical History:   Procedure Laterality Date    FRACTURE SURGERY      left thumb    TRANSFORAMINAL EPIDURAL INJECTION OF STEROID Bilateral 1/17/2020    Procedure: Injection,steroid,epidural,transforaminal approach L4/5;  Surgeon: Isaias Rodriguez MD;  Location: [facility];  Service: Pain Management;  Laterality: Bilateral;       Family History   Problem Relation Age of Onset    Hypertension Mother     Diabetes Mother        Social History     Socioeconomic History    Marital status: Single     Spouse name: Not on file    Number of children: Not on file    Years of education: Not on file    Highest education level: Not on file   Occupational History    Not on file   Social Needs    Financial resource strain: Not on file    Food insecurity:     Worry: Not on file     Inability: Not on file    Transportation needs:     Medical: Not on file     Non-medical: Not on file   Tobacco Use    Smoking status: Never Smoker    Smokeless tobacco: Never Used   Substance and Sexual Activity    Alcohol use: Yes     Comment: occaisonal    Drug use: No    Sexual activity: Never   Lifestyle    Physical activity:     Days per week: Not on file     Minutes per session: Not on file    Stress: Not on file   Relationships    Social connections:     Talks on phone: Not on file     Gets together: Not on file     Attends Shinto service: Not on file     Active member of club or organization: Not on file     Attends meetings of clubs or organizations: Not on file     Relationship status: Not on file   Other Topics Concern    Not on file   Social History Narrative    Not on file       No current facility-administered  "medications for this encounter.        Review of patient's allergies indicates:  No Known Allergies    Vitals:    05/14/20 1629 05/15/20 1342   BP:  125/74   Pulse:  78   Resp:  18   Temp:  98.1 °F (36.7 °C)   TempSrc:  Skin   SpO2:  98%   Weight: 126.1 kg (278 lb) 120.2 kg (265 lb)   Height: 5' 11" (1.803 m) 6' (1.829 m)       REVIEW OF SYSTEMS:     GENERAL: No weight loss, malaise or fevers.  HEENT:  No recent changes in vision or hearing  NECK: Negative for lumps, no difficulty with swallowing.  RESPIRATORY: Negative for cough, wheezing or shortness of breath, patient denies any recent URI.  CARDIOVASCULAR: Negative for chest pain, leg swelling or palpitations.  GI: Negative for abdominal discomfort, blood in stools or black stools or change in bowel habits.  MUSCULOSKELETAL: See HPI.  SKIN: Negative for lesions, rash, and itching.  PSYCH: No suicidal or homicidal ideations, no current mood disturbances.  HEMATOLOGY/LYMPHOLOGY: Negative for prolonged bleeding, bruising easily or swollen nodes. Patient is not currently taking any anti-coagulants  ENDO: No history of diabetes or thyroid dysfunction  NEURO: No history of syncope, paralysis, seizures or tremors.All other reviewed and negative other than HPI.    Physical exam:  Gen: A and O x3, pleasant, well-groomed  Skin: No rashes or obvious lesions  HEENT: PERRLA, no obvious deformities on ears or in canals. No thyroid masses, trachea midline, no palpable lymph nodes in neck, axilla.  CVS: Regular rate and rhythm, normal S1 and S2, no murmurs.  Resp: Clear to auscultation bilaterally.  Abdomen: Soft, NT/ND, normal bowel sounds present.  Musculoskeletal/Neuro: Moving all extremities    Assessment:  Lumbar radiculopathy  -     Case Request Operating Room: Injection-steroid-epidural-lumbar L5/S1  -     Place in Outpatient; Standing  -     Diet NPO; Standing  -     alprazolam ODT dissolvable tablet 1 mg  -     Notify physician ; Standing  -     Notify physician ; " Standing  -     Notify physician (specify); Standing  -     Verify informed consent; Standing  -     Vital signs; Standing    Other orders  -     Progressive Mobility Protocol (mobilize patient to their highest level of functioning at least twice daily); Standing  -     IP VTE LOW RISK PATIENT; Standing

## 2020-05-25 ENCOUNTER — NURSE TRIAGE (OUTPATIENT)
Dept: ADMINISTRATIVE | Facility: CLINIC | Age: 40
End: 2020-05-25

## 2020-05-25 NOTE — TELEPHONE ENCOUNTER
Spoke with patient on behalf of [facility] and he c/o upper L side of back pain. States on a scale of 1-10 with 10 being worst, his pain is now a 2. Disposition per protocol is home care. Had question about MD appointment-answered question.     Reason for Disposition   Back pain    Additional Information   Negative: Passed out (i.e., fainted, collapsed and was not responding)   Negative: Shock suspected (e.g., cold/pale/clammy skin, too weak to stand, low BP, rapid pulse)   Negative: Sounds like a life-threatening emergency to the triager   Negative: Major injury to the back (e.g., MVA, fall > 10 feet or 3 meters, penetrating injury, etc.)   Negative: Pain in the upper back over the ribs (rib cage) that radiates (travels) into the chest   Negative: Pain in the upper back over the ribs (rib cage) and worsened by coughing (or clearly increases with breathing)   Negative: SEVERE back pain of sudden onset and age > 60   Negative: Abdominal pain and age > 60   Negative: SEVERE abdominal pain (e.g., excruciating)   Negative: Unable to urinate (or only a few drops) and bladder feels very full   Negative: Loss of bladder or bowel control (urine or bowel incontinence; wetting self, leaking stool) of new onset   Negative: Numbness (loss of sensation) in groin or rectal area   Negative: Pain radiates into groin, scrotum   Negative: Blood in urine (red, pink, or tea-colored)   Negative: Vomiting and pain over lower ribs of back (i.e., flank - kidney area)   Negative: Weakness of a leg or foot (e.g., unable to bear weight, dragging foot)   Negative: Patient sounds very sick or weak to the triager   Negative: Fever > 100.5 F (38.1 C) and flank pain   Negative: Pain or burning with passing urine (urination)   Negative: SEVERE back pain (e.g., excruciating, unable to do any normal activities) and not improved after pain medicine and CARE ADVICE   Negative: Numbness in an arm or hand (i.e., loss of  sensation) and upper back pain   Negative: Numbness in a leg or foot (i.e., loss of sensation)   Negative: High-risk adult (e.g., history of cancer, history of HIV, or history of IV drug abuse)   Negative: Painful rash with multiple small blisters grouped together (i.e., dermatomal distribution or 'band' or 'stripe')   Negative: Pain radiates into the thigh or further down the leg, and in both legs   Negative: Age > 50 and no history of prior similar back pain   Negative: MODERATE back pain (e.g., interferes with normal activities) and present > 3 days   Negative: Pain radiates into the thigh or further down the leg   Negative: Patient wants to be seen   Negative: Back pain lasts > 2 weeks   Negative: Back pain is a chronic symptom (recurrent or ongoing AND lasting > 4 weeks)    Protocols used: BACK PAIN-A-[place]

## 2020-06-17 ENCOUNTER — OFFICE VISIT (OUTPATIENT)
Dept: PAIN MEDICINE | Facility: CLINIC | Age: 40
End: 2020-06-17
Payer: COMMERCIAL

## 2020-06-17 VITALS
BODY MASS INDEX: 37.78 KG/M2 | TEMPERATURE: 100 F | HEART RATE: 65 BPM | WEIGHT: 278.56 LBS | SYSTOLIC BLOOD PRESSURE: 152 MMHG | DIASTOLIC BLOOD PRESSURE: 99 MMHG | RESPIRATION RATE: 18 BRPM

## 2020-06-17 DIAGNOSIS — M54.16 LUMBAR RADICULOPATHY: Primary | ICD-10-CM

## 2020-06-17 DIAGNOSIS — M54.9 DORSALGIA, UNSPECIFIED: ICD-10-CM

## 2020-06-17 DIAGNOSIS — M51.36 DDD (DEGENERATIVE DISC DISEASE), LUMBAR: ICD-10-CM

## 2020-06-17 PROCEDURE — 3008F BODY MASS INDEX DOCD: CPT | Mod: CPTII,S$GLB,, | Performed by: ANESTHESIOLOGY

## 2020-06-17 PROCEDURE — 3008F PR BODY MASS INDEX (BMI) DOCUMENTED: ICD-10-PCS | Mod: CPTII,S$GLB,, | Performed by: ANESTHESIOLOGY

## 2020-06-17 PROCEDURE — 99999 PR PBB SHADOW E&M-EST. PATIENT-LVL III: ICD-10-PCS | Mod: PBBFAC,,, | Performed by: ANESTHESIOLOGY

## 2020-06-17 PROCEDURE — 99999 PR PBB SHADOW E&M-EST. PATIENT-LVL III: CPT | Mod: PBBFAC,,, | Performed by: ANESTHESIOLOGY

## 2020-06-17 PROCEDURE — 99213 OFFICE O/P EST LOW 20 MIN: CPT | Mod: S$GLB,,, | Performed by: ANESTHESIOLOGY

## 2020-06-17 PROCEDURE — 99213 PR OFFICE/OUTPT VISIT, EST, LEVL III, 20-29 MIN: ICD-10-PCS | Mod: S$GLB,,, | Performed by: ANESTHESIOLOGY

## 2020-06-17 RX ORDER — ALPRAZOLAM 1 MG/1
1 TABLET ORAL
Qty: 1 TABLET | Refills: 0 | Status: SHIPPED | OUTPATIENT
Start: 2020-06-17 | End: 2021-10-29 | Stop reason: CLARIF

## 2020-06-17 NOTE — PROGRESS NOTES
This note was completed with dictation software and grammatical errors may exist.    CC:  Low back and bilateral leg pain    HPI:  The patient is a 39-year-old male with past medical history significant for hypertension who presents in referral from Yamileth Reese PA-C for low back and bilateral leg pain.  He is status post L5/S1 interlaminar MAXIMUS on 05/15/2020 with about 80% relief but only lasting for 2 weeks.  The pain has returned and it is just as bad as it was initially.  He is having difficulty with walking, sitting for long.  He denies any ronit weakness or bowel or bladder incontinence but the pain is causing him difficulty with normal activities, difficulty with sleeping.          Pain intervention history:  He has tried physical therapy without sufficient relief.  He is taking gabapentin with mild relief.He is status post bilateral L4/5 transforaminal epidural steroid injections on 01/17/2020 with only 2 days of relief.    ROS:  The patient reports back pain.  Balance of review of systems is negative.    Past Medical History:   Diagnosis Date    Diabetes mellitus     pre diabetic    H/O bronchitis     Hypertension        Past Surgical History:   Procedure Laterality Date    EPIDURAL STEROID INJECTION INTO LUMBAR SPINE N/A 5/15/2020    Procedure: Injection-steroid-epidural-lumbar L5/S1;  Surgeon: Isaias Rodriguez MD;  Location: [facility] OR;  Service: Pain Management;  Laterality: N/A;    FRACTURE SURGERY      left thumb    TRANSFORAMINAL EPIDURAL INJECTION OF STEROID Bilateral 1/17/2020    Procedure: Injection,steroid,epidural,transforaminal approach L4/5;  Surgeon: Isaias Rodriguez MD;  Location: [facility] OR;  Service: Pain Management;  Laterality: Bilateral;       Social History     Socioeconomic History    Marital status: Single     Spouse name: Not on file    Number of children: Not on file    Years of education: Not on file    Highest education level: Not on file   Occupational History     Not on file   Social Needs    Financial resource strain: Not on file    Food insecurity     Worry: Not on file     Inability: Not on file    Transportation needs     Medical: Not on file     Non-medical: Not on file   Tobacco Use    Smoking status: Never Smoker    Smokeless tobacco: Never Used   Substance and Sexual Activity    Alcohol use: Yes     Comment: occaisonal    Drug use: No    Sexual activity: Never   Lifestyle    Physical activity     Days per week: Not on file     Minutes per session: Not on file    Stress: Not on file   Relationships    Social connections     Talks on phone: Not on file     Gets together: Not on file     Attends Nondenominational service: Not on file     Active member of club or organization: Not on file     Attends meetings of clubs or organizations: Not on file     Relationship status: Not on file   Other Topics Concern    Not on file   Social History Narrative    Not on file         Medications/Allergies: See med card    Vitals:    06/17/20 1050   BP: (!) 152/99   Pulse: 65   Resp: 18   Temp: 99.8 °F (37.7 °C)   Weight: 126.4 kg (278 lb 9.2 oz)   PainSc:   6   PainLoc: Buttocks         Physical exam:  Gen: A and O x3, pleasant, well-groomed  Skin: No rashes or obvious lesions  HEENT: PERRLA, no obvious deformities on ears or in canals. Trachea midline.  CVS: Regular rate and rhythm, normal palpable pulses.  Resp:No increased work of breathing, symmetrical chest rise.  Abdomen: Soft, NT/ND.  Musculoskeletal:  Antalgic gait, does not appear to put full weight on the left leg.      Neuro:  Lower extremities: 5/5 strength bilaterally  Reflexes: Patellar 2+, Achilles 2+ bilaterally.  Sensory:  Intact and symmetrical to light touch and pinprick in L2-S1 dermatomes bilaterally.    Lumbar spine:  Lumbar spine: ROM is full with flexion extension and oblique extension with increased low back pain especially with flexion.   Chino's test causes no increased pain on either side.    Supine  straight leg raise causes left greater than right buttock and lateral thigh pain.  Internal and external rotation of the hip causes no increased pain on either side.  Myofascial exam: No tenderness to palpation across lumbar paraspinous muscles.      Imagin2019 MRI lumbar spine per Neurosurgery notes  There is a large left worse than right disc herniation causing severe left lateral recess and foraminal stenosis at L4-5. There is mild facet arthropathy.    Assessment:  The patient is a 39-year-old male with past medical history significant for hypertension who presents in referral from Yamileth Reese PA-C for low back and bilateral leg pain.     1. Lumbar radiculopathy  MRI Lumbar Spine Without Contrast   2. DDD (degenerative disc disease), lumbar  MRI Lumbar Spine Without Contrast   3. Dorsalgia, unspecified         Plan:  1.  We discussed that he has done physical therapy, has had 2 separate epidural steroid injections with slight relief but none with sustained duration, he would like to return to see Neurosurgery for possible surgical options.  He has seen them in the past, I will have him return, he will follow up with us as needed for now.

## 2020-07-06 ENCOUNTER — TELEPHONE (OUTPATIENT)
Dept: SPINE | Facility: CLINIC | Age: 40
End: 2020-07-06

## 2020-07-06 ENCOUNTER — TELEPHONE (OUTPATIENT)
Dept: PAIN MEDICINE | Facility: CLINIC | Age: 40
End: 2020-07-06

## 2020-07-06 ENCOUNTER — TELEPHONE (OUTPATIENT)
Dept: PAIN MEDICINE | Facility: HOSPITAL | Age: 40
End: 2020-07-06

## 2020-07-06 ENCOUNTER — HOSPITAL ENCOUNTER (OUTPATIENT)
Dept: RADIOLOGY | Facility: HOSPITAL | Age: 40
Discharge: HOME | End: 2020-07-06
Attending: ANESTHESIOLOGY
Payer: COMMERCIAL

## 2020-07-06 ENCOUNTER — HOSPITAL ENCOUNTER (OUTPATIENT)
Dept: RADIOLOGY | Facility: HOSPITAL | Age: 40
Discharge: HOME | End: 2020-07-06
Attending: PHYSICIAN ASSISTANT
Payer: COMMERCIAL

## 2020-07-06 DIAGNOSIS — M54.50 LOW BACK PAIN, NON-SPECIFIC: ICD-10-CM

## 2020-07-06 DIAGNOSIS — M54.16 LUMBAR RADICULOPATHY: ICD-10-CM

## 2020-07-06 DIAGNOSIS — M51.36 DDD (DEGENERATIVE DISC DISEASE), LUMBAR: ICD-10-CM

## 2020-07-06 PROCEDURE — 72148 MRI LUMBAR SPINE WITHOUT CONTRAST: ICD-10-PCS | Mod: 26,,, | Performed by: RADIOLOGY

## 2020-07-06 PROCEDURE — 72148 MRI LUMBAR SPINE W/O DYE: CPT | Mod: TC,PO

## 2020-07-06 PROCEDURE — 72110 X-RAY EXAM L-2 SPINE 4/>VWS: CPT | Mod: TC,FY,PO

## 2020-07-06 PROCEDURE — 72148 MRI LUMBAR SPINE W/O DYE: CPT | Mod: 26,,, | Performed by: RADIOLOGY

## 2020-07-06 PROCEDURE — 72110 X-RAY EXAM L-2 SPINE 4/>VWS: CPT | Mod: 26,,, | Performed by: RADIOLOGY

## 2020-07-06 PROCEDURE — 72110 XR LUMBAR SPINE 5 VIEW WITH FLEX AND EXT: ICD-10-PCS | Mod: 26,,, | Performed by: RADIOLOGY

## 2020-07-06 RX ORDER — HYDROCODONE BITARTRATE AND ACETAMINOPHEN 5; 325 MG/1; MG/1
1 TABLET ORAL EVERY 8 HOURS PRN
Qty: 21 TABLET | Refills: 0 | Status: SHIPPED | OUTPATIENT
Start: 2020-07-06 | End: 2020-08-13 | Stop reason: SDUPTHER

## 2020-07-06 NOTE — TELEPHONE ENCOUNTER
Spoke with Patient. MRI shows large left L4-L5 disc herniation with severe central.lateralrecess/foraminal stenosis.     Discussed with Dr. Aceves. Multiple injections with only short term relief. Appt made with Dr. Aceves Thursday 9 am. Pt confirmed

## 2020-07-06 NOTE — TELEPHONE ENCOUNTER
----- Message from Susana Hayward sent at 7/6/2020  4:00 PM CDT -----  Regarding: Patient Return Call  Type:  Patient Returning Call    Who Called: Patient   Who Left Message for Patient:  Abelino  Does the patient know what this is regarding?:  Pain Meds  Best Call Back Number:  [phone]  Additional Information:  advised patient to stay by the phone

## 2020-07-06 NOTE — TELEPHONE ENCOUNTER
----- Message from Yamileth Reese PA-C sent at 7/6/2020 12:49 PM CDT -----  Regarding: SEVERE L4/5 central stenosis with disc herniation  I tried to call this patient x4 to discuss imaging. No answer and VM not set up. Needs urgent NS appt. Was referred to Dr. Aceves initially. Severe L4/5 central/lateral recess/foraminal stenosis. Will likely need discectomy asap   ----- Message -----  From: Interface, Rad Results In  Sent: 7/6/2020  12:27 PM CDT  To: Yamileth Reese PA-C

## 2020-07-06 NOTE — TELEPHONE ENCOUNTER
----- Message from Susana Hayward sent at 7/6/2020  4:01 PM CDT -----  Regarding: Patient Return Call  Type:  Patient Returning Call    Who Called:  Patient   Who Left Message for Patient:  Yamileth Reese  Does the patient know what this is regarding?:  scheduled appt  Best Call Back Number:  [phone]  Additional Information:  advised patient to stay by the phone

## 2020-07-06 NOTE — TELEPHONE ENCOUNTER
----- Message from Faye Weber sent at 7/6/2020 11:15 AM CDT -----  Regarding: IN PAIN  Good morning! Mr. Campbell says that he is in a great deal of pain and wanted to discuss pain medicine with the doctor. Patient's best call back # is [phone]    Thank you

## 2020-07-07 NOTE — TELEPHONE ENCOUNTER
----- Message from Susana Hayward sent at 7/6/2020  4:00 PM CDT -----  Regarding: Patient Return Call  Type:  Patient Returning Call    Who Called: Patient   Who Left Message for Patient:  Abelino  Does the patient know what this is regarding?:  Pain Meds  Best Call Back Number:  [phone]  Additional Information:  advised patient to stay by the phone

## 2020-07-09 ENCOUNTER — OFFICE VISIT (OUTPATIENT)
Dept: NEUROSURGERY | Facility: CLINIC | Age: 40
End: 2020-07-09
Payer: COMMERCIAL

## 2020-07-09 VITALS — HEART RATE: 68 BPM | DIASTOLIC BLOOD PRESSURE: 87 MMHG | TEMPERATURE: 98 F | SYSTOLIC BLOOD PRESSURE: 137 MMHG

## 2020-07-09 DIAGNOSIS — M54.16 LUMBAR RADICULOPATHY: ICD-10-CM

## 2020-07-09 DIAGNOSIS — M51.26 LUMBAR DISC HERNIATION: Primary | ICD-10-CM

## 2020-07-09 PROCEDURE — 99244 OFF/OP CNSLTJ NEW/EST MOD 40: CPT | Mod: S$GLB,,, | Performed by: STUDENT IN AN ORGANIZED HEALTH CARE EDUCATION/TRAINING PROGRAM

## 2020-07-09 PROCEDURE — 99244 PR OFFICE CONSULTATION,LEVEL IV: ICD-10-PCS | Mod: S$GLB,,, | Performed by: STUDENT IN AN ORGANIZED HEALTH CARE EDUCATION/TRAINING PROGRAM

## 2020-07-09 PROCEDURE — 99999 PR PBB SHADOW E&M-EST. PATIENT-LVL III: CPT | Mod: PBBFAC,,, | Performed by: STUDENT IN AN ORGANIZED HEALTH CARE EDUCATION/TRAINING PROGRAM

## 2020-07-09 PROCEDURE — 99999 PR PBB SHADOW E&M-EST. PATIENT-LVL III: ICD-10-PCS | Mod: PBBFAC,,, | Performed by: STUDENT IN AN ORGANIZED HEALTH CARE EDUCATION/TRAINING PROGRAM

## 2020-07-09 NOTE — PROGRESS NOTES
[facility] Neurosurgery  Clinic Consult     Consult Requested By: Yamileth Reese, *  PCP: Primary Doctor No    SUBJECTIVE:     Chief Complaint:   Chief Complaint   Patient presents with    Lumbar Spine Pain (L-Spine)     Patient reports low back pain for about 1 year; bilateral leg pain; left leg worse; unable to sit, stand or walk for prolonged periods of time; pain 7/10       History of Present Illness:  Lane Campbell is a 39 y.o. male with HTN and DM who presents for evaluation of low back and bilateral leg pain. Patient reports onset in 2018. He was seen by Yamileth Reese PA-C in November 2019 who referred him to pain management for injections. He reports bilateral buttock region pain that radiates down bilateral legs, L>R, in the S1 distribution stopping above the foot. He describes the pain as electric, throbbing, aching. He denies numbness/tingling. He reports subjective weakness and states his legs will give out occasionally. He reports an episode of urinary incontinence in the past. He denies bowel/bladder dysfunction currently. He completed PT in the past without relief. He received bilateral L4/5 TFESI on 1/17/20 with only 2 days relief and L5/S1 MAXIMUS on 5/15 without relief. He presents for surgical consultation after updated MRI lumbar spine. His ADLs are limited due to pain. He is sleeping on the floor since his in unable to get into bed due to pain. He reports gabapentin is providing no relief.     Pertinent and recent history, provider evaluations, imaging and data reviewed in EPIC        Past Medical History:   Diagnosis Date    Diabetes mellitus     pre diabetic    H/O bronchitis     Hypertension      Past Surgical History:   Procedure Laterality Date    EPIDURAL STEROID INJECTION INTO LUMBAR SPINE N/A 5/15/2020    Procedure: Injection-steroid-epidural-lumbar L5/S1;  Surgeon: Isaias Rodriguez MD;  [facility]: [facility];  Service: Pain Management;  Laterality: N/A;    FRACTURE  SURGERY      left thumb    TRANSFORAMINAL EPIDURAL INJECTION OF STEROID Bilateral 1/17/2020    Procedure: Injection,steroid,epidural,transforaminal approach L4/5;  Surgeon: Isaias Rodriguez MD;  Location: [facility];  Service: Pain Management;  Laterality: Bilateral;     Family History   Problem Relation Age of Onset    Hypertension Mother     Diabetes Mother      Social History     Tobacco Use    Smoking status: Never Smoker    Smokeless tobacco: Never Used   Substance Use Topics    Alcohol use: Yes     Comment: occaisonal    Drug use: No      Review of patient's allergies indicates:  No Known Allergies    Current Outpatient Medications:     ALPRAZolam (XANAX) 1 MG tablet, Take 1 tablet (1 mg total) by mouth On call Procedure for Anxiety. Take 30min prior to procedure, Disp: 1 tablet, Rfl: 0    etodolac (LODINE) 300 MG Cap, Take 1 capsule (300 mg total) by mouth 2 (two) times daily as needed., Disp: 60 capsule, Rfl: 0    gabapentin (NEURONTIN) 300 MG capsule, Start by taking 1 capsule QHS x1 week, then BID x1 week, then TID thereafter., Disp: 90 capsule, Rfl: 1    HYDROcodone-acetaminophen (NORCO) 5-325 mg per tablet, Take 1 tablet by mouth every 8 (eight) hours as needed for Pain., Disp: 21 tablet, Rfl: 0    hydrocortisone-pramoxine (PROCTOFOAM-HS) rectal foam, Place 1 applicator rectally 2 (two) times daily., Disp: 1 applicator, Rfl: 2    losartan-hydrochlorothiazide 100-25 mg (HYZAAR) 100-25 mg per tablet, Take 0.5 tablets by mouth once daily.  Take 1/2 pill every AM, Disp: , Rfl: 1    Review of Systems:   Constitutional: no fever, chills or night sweats. No changes in weight   Eyes: no visual changes   ENT: no nasal congestion or sore throat   Respiratory: no cough or shortness of breath   Cardiovascular: no chest pain or palpitations   Gastrointestinal: no nausea or vomiting   Genitourinary: no hematuria or dysuria   Integument/Breast: no rash or pruritis   Hematologic/Lymphatic: no easy bruising  or lymphadenopathy   Musculoskeletal: +back pain   Neurological: no seizures or tremors +paresthesias   Behavioral/Psych: no auditory or visual hallucinations   Endocrine: no heat or cold intolerance         OBJECTIVE:     Vital Signs (Most Recent):  Temp: 97.8 °F (36.6 °C) (07/09/20 0855)  Pulse: 68 (07/09/20 0855)  BP: 137/87 (07/09/20 0855)  Estimated body mass index is 37.78 kg/m² as calculated from the following:    Height as of 5/15/20: 6' (1.829 m).    Weight as of 6/17/20: 126.4 kg (278 lb 9.2 oz).    Physical Exam:   General: well developed, well nourished, no distress.   Neurologic: Alert and oriented. Thought content appropriate. GCS 15.   Language: No aphasia  Speech: No dysarthria  Head: normocephalic, atraumatic  Eyes: EOMI.  Neck: trachea midline, no JVD   Cardiovascular: no LE edema  Pulmonary: normal respirations, no signs of respiratory distress  Abdomen: non-distended  Skin: Skin is warm, dry and intact.    Motor Strength: Moves all extremities spontaneously with good tone. No abnormal movements seen.     Strength  Deltoids Triceps Biceps Wrist Extension Wrist Flexion Hand  Interossei   Upper: R 5/5 5/5 5/5 5/5 5/5 5/5 5/5    L 5/5 5/5 5/5 5/5 5/5 5/5 5/5     Iliopsoas Quadriceps Knee  Flexion Tibialis  anterior Gastro- cnemius EHL    Lower: R 5/5 5/5 5/5 5/5 5/5 5/5     L 5/5 5/5 5/5 4-/5 4-/5 4/5      DTR's: 2 + UE, 1+ LE   Clonus: absent  Diminished sensation bilateral S1 distribution   Gait: antalgic             Difficulty standing on toes and heels    Lumbar Spine: limited ROM and TTP   +SLR left         Diagnostic Results:  I have independently reviewed the following imaging:  MRI: Reviewed  L4-5 moderate degenerative disc disease and facet arthropathy there is a large central/left posterolateral disc herniation with severe mass effect and lateral recess  He is central stenosis as well.  This proved compared to an MRI in 2017 as for backward this was present but has significantly  progressed  ASSESSMENT/PLAN:     Lumbar disc herniation    Lumbar radiculopathy        Lane Campbell is a 39 y.o. male    With degenerative disc disease, lumbar stenosis with a lumbar disc herniation left posterolateral central severe lateral recess and foraminal stenosis.  No deformity the significant disc height loss  He does have quite a bit of spondylosis at this level for his age  He has a partial footdrop with severe radiculopathy  He has failed conservative treatment he is severely limited he has an antalgic gait does not stand upright.  Has difficulty getting in bed or mobilizing.  He is a  by profession he has noted no functional loss concerns sitting and driving some, however his foot function is critical to his profession  I he has been symptomatic for years but does feel like his radiculopathy is worsened throughout 2020.  There is concern for permanent neurological deficit due to the length of time his symptoms.  This was discussed transparent  Goal decompression is to minimize progression of neurological deficit worsening and optimize his chance for maximal recovery    We discussed treatment options a decompression versus a decompression and fusion risk benefits pros and cons of each.  He is considering proceeding with a minimally invasive L4-5 laminectomy and left-sided microdiskectomy        The diagnosis, goals, limitations, risks and benefits of surgery and alternative treatment options where discussed at length (pros/cons). All questions/concerns were addressed. The patient has verbalized a good understanding of the diagnosis, the planned procedure, anticipated post-operative course, overall expectations, and risks including but not limited to:  nerve root injury/paralysis, death, nerve injury leading to pain or neurological deficit, csf leak, vascular injury or serious bleeding/need for blood product transfusion/stroke, wrong level surgery,chronic pain/failure to improve or worsening of  symptoms, infection,, need for further surgery at the same or different levels; medical (eg Heart attack, blood clot, infection) and anesthetic complications.      Patient verbalized understanding of plan. Encouraged to call with any questions or concerns.     This note was partially dictated using voice recognition software, so please excuse any errors that were not corrected.

## 2020-07-09 NOTE — LETTER
August 19, 2020      Yamileth Reese PA-C  [address] [facility] [address]           [place] - Neurosurgery  [address] [place] [address]  Phone: [phone]  Fax: [phone]          Patient: Lane Campbell   MR Number: [id number]   YOB: 1980   Date of Visit: 7/9/2020       Dear Yamileth Reese:    Thank you for referring Lane Campbell to me for evaluation. Attached you will find relevant portions of my assessment and plan of care.    If you have questions, please do not hesitate to call me. I look forward to following Lane Campbell along with you.    Sincerely,    Sg Aceves MD    Enclosure  CC:  No Recipients    If you would like to receive this communication electronically, please contact [email or web address] or [phone] to request more information on [employer] Link access.    For providers and/or their staff who would like to refer a patient to [facility], please contact us through our one-stop-shop provider referral line, [facility], at [phone].    If you feel you have received this communication in error or would no longer like to receive these types of communications, please e-mail [email or web address]

## 2020-07-09 NOTE — PATIENT INSTRUCTIONS
Understanding Minimally Invasive Lumbar Diskectomy  Lumbar diskectomy is a type of surgery to fix a disk in the lower back. Minimally invasive surgery uses two or more small cuts (incisions) instead of one large incision. This may lead to less pain after surgery, and faster recovery.  Parts of the spine  Your backbone, or spinal column, is made up of a chain of bones called the vertebrae. Your spinal cord runs through the spinal column. The bones help protect the cord from injury. Disks sit between each vertebra. The disks give cushioning and support. Large nerves called nerve roots lead from the spinal cord through small holes in the bones called foramen. These nerve roots send and receive signals to and from the body. The signals are sent to and from your brain through the spinal cord.  Sometimes the outer wall of one of these disks may dry out and weaken with age or injury. When this happens, the soft, inner part of the disk bulges out. This is called a herniated or bulging disk. This bulging disk can press on the spinal cord. This can cause symptoms such as pain, tingling, or weakness in a nearby part of the body.  Why minimally invasive lumbar diskectomy is done  You may need this surgery if you have a herniated disk in your lower back that is causing symptoms. The symptoms may include weakness, pain, or tingling in the back area and in one of your legs.  Lumbar diskectomy cant be used to treat all cases of back pain. And not everyone with a herniated disk needs a lumbar diskectomy. Your doctor might advise the surgery if you have tried other treatments but still have severe symptoms. Other treatments to try first include physical therapy and anti-inflammatory medicines.  How minimally invasive lumbar diskectomy is done  The surgery is done by an orthopedic surgeon and a trained medical team. The surgeon will use a special type of X-ray to view the surgery. The doctor will make a small incision on your back  in the area that needs to be treated. The surgeon will put a tubular retractor into this incision. This will expose the part of the spine to be treated. The surgeon will then pass small tools through this retractor. This includes a tiny camera and a light. Your doctor will remove the herniated portion of the disk using small tools. Other repairs will be done as needed.  Risks of minimally invasive lumbar diskectomy  Every surgery has risks. Risks for this surgery include:  · Infection  · Excess bleeding  · Blood clots  · Injury to nearby nerves  · Reaction to anesthetic medicines  · Only temporary relief  · Need for another surgery  Your risks may vary depending on your age and your general health. Talk with your doctor about the risks that most apply to you.  Date Last Reviewed: 5/15/2015  © 6956-3793 [employer] [address] [place] [address], [place] [address]. All rights reserved. This information is not intended as a substitute for professional medical care. Always follow your healthcare professional's instructions.

## 2020-07-13 ENCOUNTER — TELEPHONE (OUTPATIENT)
Dept: NEUROSURGERY | Facility: CLINIC | Age: 40
End: 2020-07-13

## 2020-07-14 ENCOUNTER — TELEPHONE (OUTPATIENT)
Dept: NEUROSURGERY | Facility: CLINIC | Age: 40
End: 2020-07-14

## 2020-07-21 ENCOUNTER — TELEPHONE (OUTPATIENT)
Dept: PAIN MEDICINE | Facility: CLINIC | Age: 40
End: 2020-07-21

## 2020-07-21 RX ORDER — HYDROCODONE BITARTRATE AND ACETAMINOPHEN 10; 325 MG/1; MG/1
1 TABLET ORAL 3 TIMES DAILY PRN
Qty: 21 TABLET | Refills: 0 | Status: SHIPPED | OUTPATIENT
Start: 2020-07-21 | End: 2020-07-28

## 2020-07-21 NOTE — TELEPHONE ENCOUNTER
I have sent in a stronger pain medication, however, he needs to go ahead and schedule surgery.  Please find out if he is proceeding with this

## 2020-07-21 NOTE — TELEPHONE ENCOUNTER
Informed patient that medication was sent to pharmacy. Patient stated he is getting things together with school and once it is settle he will schedule surgery.

## 2020-07-21 NOTE — TELEPHONE ENCOUNTER
----- Message from Renae Wu sent at 7/20/2020  5:26 PM CDT -----  Lane would like something stronger for the pain. He's unable to sleep at night. Please call [phone] to discuss. Please send to [facility].    [id number] - [place] - [address] AT [facility] OF [address] [phone] (Phone)  [phone] (Fax)

## 2020-08-13 RX ORDER — HYDROCODONE BITARTRATE AND ACETAMINOPHEN 5; 325 MG/1; MG/1
1 TABLET ORAL EVERY 8 HOURS PRN
Qty: 21 TABLET | Refills: 0 | Status: SHIPPED | OUTPATIENT
Start: 2020-08-13 | End: 2021-10-29 | Stop reason: CLARIF

## 2020-08-13 NOTE — TELEPHONE ENCOUNTER
----- Message from Mary Ann Allen sent at 8/12/2020  3:49 PM CDT -----  Contact: call  pt [phone]    Type:  RX Refill Request    Who Called: pt  Refill   RX Name and Strength:  HYDROcodone-acetaminophen  10  mg  How is the patient currently taking it? (ex. 1XDay):   twice  a day  Is this a 30 day or 90 day RX:  21  table  Preferred Pharmacy with phone number:    [facility]     [place]   pt  doesn't t  have  the  number   Best Call Back Number:  call  pt [phone]  Additional Information:  #  please call  pt back // pt   has a  new  pharmacy

## 2020-08-18 ENCOUNTER — TELEPHONE (OUTPATIENT)
Dept: PAIN MEDICINE | Facility: CLINIC | Age: 40
End: 2020-08-18

## 2020-08-18 NOTE — TELEPHONE ENCOUNTER
Per :    I sent him a prescription because he was getting set up for surgery but we are not going to continue giving him stronger medication if he is not actually doing anything to take care of this pain.    Pt notified. All questions answered.//lp

## 2020-08-18 NOTE — TELEPHONE ENCOUNTER
I sent him a prescription because he was getting set up for surgery but we are not going to continue giving him stronger medication if he is not actually doing anything to take care of this pain.

## 2020-08-18 NOTE — TELEPHONE ENCOUNTER
----- Message from Shazia Green sent at 8/14/2020  8:48 AM CDT -----  Type: Needs Medical Advice  Who Called:  pt  Best Call Back Number: [phone]  Additional Information: pt states that he spoke to a nurse and was told that his medication would be increased from HYDROcodone-acetaminophen (NORCO) 5-325 mg per tablet to 10mg. States that the 5mg was sent to the pharmacy yesterday. Please give call back. Thanks

## 2020-08-18 NOTE — TELEPHONE ENCOUNTER
----- Message from Anabel Baron sent at 8/17/2020  3:15 PM CDT -----  Regarding: advice  Contact: Patient/[phone] (home)  Type: Needs Medical Advice  Who Called:  Patient/[phone] (home)     Pharmacy name and phone #:    [id number] - [facility] [address] AT [facility] OF [facility] [address]  [address] [facility] [address]  Phone: [phone] Fax: [phone]       Additional Information: Office had last sent the HYDROcodone-acetaminophen (NORCO) 5-325 mg per tablet but the doctor had changed this to the 10 mg. The 10 mg was not sent out. He states he is in too much pain for the 5-325 mg. Patient called on 8/14/20 and was told that the office was sending out the 10 mg. So far, it has not been sent. Please call to advise of the status.

## 2020-09-29 ENCOUNTER — TELEPHONE (OUTPATIENT)
Dept: NEUROSURGERY | Facility: CLINIC | Age: 40
End: 2020-09-29

## 2020-09-29 NOTE — TELEPHONE ENCOUNTER
----- Message from Apurva Guerrero LPN sent at 9/28/2020  4:44 PM CDT -----  Regarding: FW: pt surgery  Contact: pt    ----- Message -----  From: Magdalena Hou  Sent: 9/28/2020   9:54 AM CDT  To: Ketan Bettencourt Staff  Subject: pt surgery                                       Pt called to schedule his surgery pt would like to have surgery around oct 10th in the mean time pt would like to know if he can get something for the pain.  please advise pt    Pt can be reached at [phone]

## 2020-10-16 ENCOUNTER — TELEPHONE (OUTPATIENT)
Dept: NEUROSURGERY | Facility: CLINIC | Age: 40
End: 2020-10-16

## 2020-10-16 NOTE — TELEPHONE ENCOUNTER
----- Message from Elise Evans sent at 10/16/2020 10:01 AM CDT -----  Regarding: Returning Call  Contact: [phone]  Type:  Patient Returning Call    Who Called:pt   Who Left Message for Patient: Apurva Guerrero   Does the patient know what this is regarding?: Being reassessed for surgery   Would the patient rather a call back or a response via [facility]?call back  Best Call Back Number:[phone]  Additional Information:

## 2021-02-11 ENCOUNTER — TELEPHONE (OUTPATIENT)
Dept: PAIN MEDICINE | Facility: CLINIC | Age: 41
End: 2021-02-11

## 2021-08-19 ENCOUNTER — OFFICE VISIT (OUTPATIENT)
Dept: NEUROSURGERY | Facility: CLINIC | Age: 41
End: 2021-08-19
Payer: COMMERCIAL

## 2021-08-19 VITALS
BODY MASS INDEX: 37.75 KG/M2 | HEART RATE: 64 BPM | DIASTOLIC BLOOD PRESSURE: 85 MMHG | SYSTOLIC BLOOD PRESSURE: 133 MMHG | HEIGHT: 72 IN | WEIGHT: 278.69 LBS

## 2021-08-19 DIAGNOSIS — M51.26 LUMBAR DISC HERNIATION: Primary | ICD-10-CM

## 2021-08-19 DIAGNOSIS — M54.16 LUMBAR RADICULOPATHY: ICD-10-CM

## 2021-08-19 PROCEDURE — 1125F AMNT PAIN NOTED PAIN PRSNT: CPT | Mod: CPTII,S$GLB,, | Performed by: STUDENT IN AN ORGANIZED HEALTH CARE EDUCATION/TRAINING PROGRAM

## 2021-08-19 PROCEDURE — 1159F MED LIST DOCD IN RCRD: CPT | Mod: CPTII,S$GLB,, | Performed by: STUDENT IN AN ORGANIZED HEALTH CARE EDUCATION/TRAINING PROGRAM

## 2021-08-19 PROCEDURE — 99214 OFFICE O/P EST MOD 30 MIN: CPT | Mod: S$GLB,,, | Performed by: STUDENT IN AN ORGANIZED HEALTH CARE EDUCATION/TRAINING PROGRAM

## 2021-08-19 PROCEDURE — 99214 PR OFFICE/OUTPT VISIT, EST, LEVL IV, 30-39 MIN: ICD-10-PCS | Mod: S$GLB,,, | Performed by: STUDENT IN AN ORGANIZED HEALTH CARE EDUCATION/TRAINING PROGRAM

## 2021-08-19 PROCEDURE — 3008F PR BODY MASS INDEX (BMI) DOCUMENTED: ICD-10-PCS | Mod: CPTII,S$GLB,, | Performed by: STUDENT IN AN ORGANIZED HEALTH CARE EDUCATION/TRAINING PROGRAM

## 2021-08-19 PROCEDURE — 99999 PR PBB SHADOW E&M-EST. PATIENT-LVL III: CPT | Mod: PBBFAC,,, | Performed by: STUDENT IN AN ORGANIZED HEALTH CARE EDUCATION/TRAINING PROGRAM

## 2021-08-19 PROCEDURE — 3079F DIAST BP 80-89 MM HG: CPT | Mod: CPTII,S$GLB,, | Performed by: STUDENT IN AN ORGANIZED HEALTH CARE EDUCATION/TRAINING PROGRAM

## 2021-08-19 PROCEDURE — 3075F SYST BP GE 130 - 139MM HG: CPT | Mod: CPTII,S$GLB,, | Performed by: STUDENT IN AN ORGANIZED HEALTH CARE EDUCATION/TRAINING PROGRAM

## 2021-08-19 PROCEDURE — 1125F PR PAIN SEVERITY QUANTIFIED, PAIN PRESENT: ICD-10-PCS | Mod: CPTII,S$GLB,, | Performed by: STUDENT IN AN ORGANIZED HEALTH CARE EDUCATION/TRAINING PROGRAM

## 2021-08-19 PROCEDURE — 3075F PR MOST RECENT SYSTOLIC BLOOD PRESS GE 130-139MM HG: ICD-10-PCS | Mod: CPTII,S$GLB,, | Performed by: STUDENT IN AN ORGANIZED HEALTH CARE EDUCATION/TRAINING PROGRAM

## 2021-08-19 PROCEDURE — 3079F PR MOST RECENT DIASTOLIC BLOOD PRESSURE 80-89 MM HG: ICD-10-PCS | Mod: CPTII,S$GLB,, | Performed by: STUDENT IN AN ORGANIZED HEALTH CARE EDUCATION/TRAINING PROGRAM

## 2021-08-19 PROCEDURE — 3008F BODY MASS INDEX DOCD: CPT | Mod: CPTII,S$GLB,, | Performed by: STUDENT IN AN ORGANIZED HEALTH CARE EDUCATION/TRAINING PROGRAM

## 2021-08-19 PROCEDURE — 1159F PR MEDICATION LIST DOCUMENTED IN MEDICAL RECORD: ICD-10-PCS | Mod: CPTII,S$GLB,, | Performed by: STUDENT IN AN ORGANIZED HEALTH CARE EDUCATION/TRAINING PROGRAM

## 2021-08-19 PROCEDURE — 99999 PR PBB SHADOW E&M-EST. PATIENT-LVL III: ICD-10-PCS | Mod: PBBFAC,,, | Performed by: STUDENT IN AN ORGANIZED HEALTH CARE EDUCATION/TRAINING PROGRAM

## 2021-08-26 ENCOUNTER — TELEPHONE (OUTPATIENT)
Dept: RADIOLOGY | Facility: HOSPITAL | Age: 41
End: 2021-08-26

## 2021-10-18 ENCOUNTER — TELEPHONE (OUTPATIENT)
Dept: NEUROSURGERY | Facility: CLINIC | Age: 41
End: 2021-10-18

## 2021-10-18 DIAGNOSIS — M54.16 LUMBAR RADICULOPATHY: ICD-10-CM

## 2021-10-18 DIAGNOSIS — M51.26 LUMBAR DISC HERNIATION: Primary | ICD-10-CM

## 2021-10-18 RX ORDER — SODIUM CHLORIDE, SODIUM LACTATE, POTASSIUM CHLORIDE, CALCIUM CHLORIDE 600; 310; 30; 20 MG/100ML; MG/100ML; MG/100ML; MG/100ML
INJECTION, SOLUTION INTRAVENOUS CONTINUOUS
Status: CANCELLED | OUTPATIENT
Start: 2021-10-18

## 2021-10-18 RX ORDER — LIDOCAINE HYDROCHLORIDE 10 MG/ML
1 INJECTION, SOLUTION EPIDURAL; INFILTRATION; INTRACAUDAL; PERINEURAL ONCE
Status: CANCELLED | OUTPATIENT
Start: 2021-10-18 | End: 2021-10-18

## 2021-11-02 ENCOUNTER — TELEPHONE (OUTPATIENT)
Dept: NEUROSURGERY | Facility: CLINIC | Age: 41
End: 2021-11-02
Payer: COMMERCIAL

## 2021-11-12 ENCOUNTER — CLINICAL SUPPORT (OUTPATIENT)
Dept: NEUROSURGERY | Facility: CLINIC | Age: 41
End: 2021-11-12
Payer: COMMERCIAL

## 2021-11-12 VITALS — TEMPERATURE: 97 F

## 2021-11-12 PROCEDURE — 99999 PR PBB SHADOW E&M-EST. PATIENT-LVL II: ICD-10-PCS | Mod: PBBFAC,,,

## 2021-11-12 PROCEDURE — 99999 PR PBB SHADOW E&M-EST. PATIENT-LVL II: CPT | Mod: PBBFAC,,,

## 2021-11-12 RX ORDER — OXYCODONE AND ACETAMINOPHEN 7.5; 325 MG/1; MG/1
1 TABLET ORAL EVERY 6 HOURS PRN
Qty: 28 TABLET | Refills: 0 | Status: SHIPPED | OUTPATIENT
Start: 2021-11-12 | End: 2021-11-30 | Stop reason: SDUPTHER

## 2021-11-12 RX ORDER — TIZANIDINE 4 MG/1
4 TABLET ORAL EVERY 8 HOURS PRN
Qty: 30 TABLET | Refills: 0 | Status: SHIPPED | OUTPATIENT
Start: 2021-11-12 | End: 2021-11-22

## 2021-11-30 RX ORDER — OXYCODONE AND ACETAMINOPHEN 7.5; 325 MG/1; MG/1
1 TABLET ORAL EVERY 6 HOURS PRN
Qty: 28 TABLET | Refills: 0 | Status: SHIPPED | OUTPATIENT
Start: 2021-11-30 | End: 2021-12-28 | Stop reason: SDUPTHER

## 2021-12-16 ENCOUNTER — OFFICE VISIT (OUTPATIENT)
Dept: NEUROSURGERY | Facility: CLINIC | Age: 41
End: 2021-12-16
Payer: COMMERCIAL

## 2021-12-16 VITALS
DIASTOLIC BLOOD PRESSURE: 99 MMHG | BODY MASS INDEX: 40.09 KG/M2 | WEIGHT: 280 LBS | HEIGHT: 70 IN | SYSTOLIC BLOOD PRESSURE: 149 MMHG | RESPIRATION RATE: 18 BRPM | HEART RATE: 77 BPM

## 2021-12-16 DIAGNOSIS — M54.16 LUMBAR RADICULOPATHY: ICD-10-CM

## 2021-12-16 DIAGNOSIS — M51.26 LUMBAR DISC HERNIATION: Primary | ICD-10-CM

## 2021-12-16 PROCEDURE — 99024 POSTOP FOLLOW-UP VISIT: CPT | Mod: S$GLB,,, | Performed by: STUDENT IN AN ORGANIZED HEALTH CARE EDUCATION/TRAINING PROGRAM

## 2021-12-16 PROCEDURE — 99024 PR POST-OP FOLLOW-UP VISIT: ICD-10-PCS | Mod: S$GLB,,, | Performed by: STUDENT IN AN ORGANIZED HEALTH CARE EDUCATION/TRAINING PROGRAM

## 2021-12-16 RX ORDER — MELOXICAM 7.5 MG/1
7.5 TABLET ORAL DAILY PRN
Qty: 30 TABLET | Refills: 0 | Status: SHIPPED | OUTPATIENT
Start: 2021-12-16 | End: 2022-01-20 | Stop reason: SDUPTHER

## 2021-12-16 RX ORDER — GABAPENTIN 300 MG/1
300 CAPSULE ORAL 3 TIMES DAILY
Qty: 90 CAPSULE | Refills: 1 | Status: ON HOLD | OUTPATIENT
Start: 2021-12-16 | End: 2023-08-28 | Stop reason: HOSPADM

## 2021-12-17 ENCOUNTER — TELEPHONE (OUTPATIENT)
Dept: NEUROSURGERY | Facility: CLINIC | Age: 41
End: 2021-12-17
Payer: COMMERCIAL

## 2021-12-20 ENCOUNTER — CLINICAL SUPPORT (OUTPATIENT)
Dept: REHABILITATION | Facility: HOSPITAL | Age: 41
End: 2021-12-20
Payer: COMMERCIAL

## 2021-12-20 DIAGNOSIS — M51.26 LUMBAR DISC HERNIATION: ICD-10-CM

## 2021-12-20 PROCEDURE — 97162 PT EVAL MOD COMPLEX 30 MIN: CPT | Mod: PN | Performed by: PHYSICAL THERAPIST

## 2021-12-28 RX ORDER — OXYCODONE AND ACETAMINOPHEN 7.5; 325 MG/1; MG/1
1 TABLET ORAL EVERY 6 HOURS PRN
Qty: 28 TABLET | Refills: 0 | Status: SHIPPED | OUTPATIENT
Start: 2021-12-28 | End: 2022-02-10

## 2021-12-30 NOTE — PROGRESS NOTES
[facility]   Physical Therapy Treatment Note     Name: Lane Campbell  Clinic Number: [id number]    Therapy Diagnosis:   Encounter Diagnoses   Name Primary?    Lumbar pain     Weakness     Impaired functional mobility and activity tolerance      Physician: Sg Aceves MD    Visit Date: 1/4/2022    Physician Orders: PT Eval and Treat   Medical Diagnosis from Referral: Lumbar disc herniation   Evaluation Date: 12/20/2021  Authorization Period Expiration: 12/31/22  Plan of Care Expiration: 01/31/2022  Progress Note Due: 01/20/22  Visit # / Visits authorized: 1/20 (1/ 1 - eval)   FOTO: 1/ 3   PTA Visit #: 1/5     Precautions: Standard    Time In: 950 am  Time Out: 1030 am  Total Appointment Time: 40 minutes    SUBJECTIVE     Pt reports: he didn't come the first two visits after the evaluation due to increased pain. The next missed visit was a COVID concern. He wasn't really able to do much of the exercises. A hot bath with epsom salt provides temporary relief from pain.    He was not compliant with home exercise program.  Response to previous treatment: initial evaluation  Functional change: too soon to tell    Pain: 5/10  Location: bilateral back    OBJECTIVE     Objective Measures updated at progress report unless specified.     Treatment     Lane received the treatments listed below:      Therapeutic exercises to develop strength, endurance, ROM, flexibility, posture and core stabilization for 32 minutes including:    Recumbent bike 5 min (Seat: 6, Level: 2)    Seated:  Hamstring stretch 10s x5 B - intense stretch  Thoracic extensions on ball 2 x10 reps    Supine:  Posterior pelvic tilts (3s hold) 2 x10 reps    Prone:  Prone on elbows 2 min    Manual therapy techniques for 8 minutes:    STM/percussion gun to bilateral lumbar paraspinals    Possible next session: sit to stands, standing lumbar extensions, seated hip exercises, bridging     Patient Education and Home Exercises     Home  Exercises Provided and Patient Education Provided     Education provided:   - Pathophysiology of condition   - HEP   - POC   - Need for increased activity within pain tolerance     Written Home Exercises Provided: yes. Exercises were reviewed and Lane was able to demonstrate them prior to the end of the session.  Lane demonstrated good  understanding of the education provided. See EMR under Patient Instructions for exercises provided during therapy sessions    ASSESSMENT     Patient with increased discomfort when performing seated hamstring stretch, may perform in supine next session. He required increased time to complete therex but was able to complete as prescribed. Percussion gun to lumbar paraspinals provided the patient with slight relief in tension/pain. Patient was advised to try and come to therapy next time he is in pain so we can assess and try to help alleviate the pain. He was also given an HEP with hamstring stretch and pelvic tilts for further flexibility and strengthening at home.    Lane Is progressing well towards his goals.   Pt prognosis is Fair.     Pt will continue to benefit from skilled outpatient physical therapy to address the deficits listed in the problem list box on initial evaluation, provide pt/family education and to maximize pt's level of independence in the home and community environment.     Pt's spiritual, cultural and educational needs considered and pt agreeable to plan of care and goals.     Anticipated barriers to physical therapy: none stated at this time    Goals:  Short Term Goals: 3 weeks   1) Pt will be I with established HEP  (Progressing, not met)  2) Pt will perform 6 sit to stands in 30 seconds to demonstrate improved functional strength  (Progressing, not met)  3) Pt will have no worse then moderate ROM loss  (Progressing, not met)     Long Term Goals: 6 weeks   1) Pt will walk for 30 minutes with pain no worse then 4/10 (Progressing, not met)  2) Pt will  perform 30 minutes of household cleaning activities with pain no worse then 4/10  (Progressing, not met)  3) Pt will perform 8 stands in 30 seconds without using his hands  (Progressing, not met)    PLAN     Continue PT current POC to improve functional mobility.    Efren Bowers, PTA

## 2022-01-04 ENCOUNTER — CLINICAL SUPPORT (OUTPATIENT)
Dept: REHABILITATION | Facility: HOSPITAL | Age: 42
End: 2022-01-04
Payer: COMMERCIAL

## 2022-01-04 DIAGNOSIS — R53.1 WEAKNESS: ICD-10-CM

## 2022-01-04 DIAGNOSIS — Z74.09 IMPAIRED FUNCTIONAL MOBILITY AND ACTIVITY TOLERANCE: ICD-10-CM

## 2022-01-04 DIAGNOSIS — M54.50 LUMBAR PAIN: ICD-10-CM

## 2022-01-04 PROCEDURE — 97140 MANUAL THERAPY 1/> REGIONS: CPT | Mod: PN,CQ

## 2022-01-04 PROCEDURE — 97110 THERAPEUTIC EXERCISES: CPT | Mod: PN,CQ

## 2022-01-06 ENCOUNTER — CLINICAL SUPPORT (OUTPATIENT)
Dept: REHABILITATION | Facility: HOSPITAL | Age: 42
End: 2022-01-06
Payer: COMMERCIAL

## 2022-01-06 DIAGNOSIS — Z74.09 IMPAIRED FUNCTIONAL MOBILITY AND ACTIVITY TOLERANCE: ICD-10-CM

## 2022-01-06 DIAGNOSIS — M54.50 LUMBAR PAIN: ICD-10-CM

## 2022-01-06 DIAGNOSIS — R53.1 WEAKNESS: ICD-10-CM

## 2022-01-06 PROCEDURE — 97110 THERAPEUTIC EXERCISES: CPT | Mod: PN,CQ

## 2022-01-06 NOTE — PROGRESS NOTES
[facility]   Physical Therapy Treatment Note     Name: Lane Campbell  Clinic Number: [id number]    Therapy Diagnosis:   Encounter Diagnoses   Name Primary?    Lumbar pain     Weakness     Impaired functional mobility and activity tolerance      Physician: Sg Aceves MD    Visit Date: 1/6/2022    Physician Orders: PT Eval and Treat   Medical Diagnosis from Referral: Lumbar disc herniation   Evaluation Date: 12/20/2021  Authorization Period Expiration: 12/31/22  Plan of Care Expiration: 01/31/2022  Progress Note Due: 01/20/22  Visit # / Visits authorized: 2/20 (1/ 1 - eval)   FOTO: 1/ 3   PTA Visit #: 2/5     Precautions: Standard    Time In: 1045 am  Time Out: 1130 am  Total Appointment Time: 45 minutes    SUBJECTIVE     Pt reports: he had some relief in pain after last session but pain resumed after 2 hours. The pain seems to go into the gluteal area and down the back of the leg at times.    He was not compliant with home exercise program.  Response to previous treatment: initial evaluation  Functional change: too soon to tell    Pain: 5.5/10  Location: bilateral back (more in the gluts)    OBJECTIVE     Objective Measures updated at progress report unless specified.     Treatment     Lane received the treatments listed below:      Therapeutic exercises to develop strength, endurance, ROM, flexibility, posture and core stabilization for 45 minutes including:    Recumbent bike 3 min (Seat: 6, Level: 2)  Treadmill 3 min (Incline: 1, Speed: 0.5) - at the end of the session with pelvic tilt and upright posture    Seated:  Thoracic extensions on ball 2 x10 reps  Hip abduction RTB 2 x10 reps  Hip adduction (3 sec hold) 2 x10 reps    Supine:  Piriformis stretch 10s x10 B  SKTC stretch with towel 10s x10 B  Hamstring stretch 10s x10 B (NP today - time)  Posterior pelvic tilts (3s hold) 2 x10 reps (NP today - time)    Prone:  Prone on elbows 2 min    Manual therapy techniques for 00  minutes:    STM/percussion gun to bilateral lumbar paraspinals    Possible next session: sit to stands, standing lumbar extensions, bridging     Patient Education and Home Exercises     Home Exercises Provided and Patient Education Provided     Education provided:   - Pathophysiology of condition   - HEP   - POC   - Need for increased activity within pain tolerance     Written Home Exercises Provided: yes. Exercises were reviewed and Lane was able to demonstrate them prior to the end of the session.  Lane demonstrated good  understanding of the education provided. See EMR under Patient Instructions for exercises provided during therapy sessions    ASSESSMENT     Patient presented to therapy today with increased pain levels and radicular symptoms down the gluteals. Piriformis and gluteal stretch were added this session to address the increased tightness he was feeling. These seemed to be pretty intense stretches but he was able to complete as prescribed and was given HEP for further increase in flexibility at home. Treadmill was attempted today with focus places on maintaining upright posture and tight core. His pain went down to 5/10 by the end of session.    Lane Is progressing well towards his goals.   Pt prognosis is Fair.     Pt will continue to benefit from skilled outpatient physical therapy to address the deficits listed in the problem list box on initial evaluation, provide pt/family education and to maximize pt's level of independence in the home and community environment.     Pt's spiritual, cultural and educational needs considered and pt agreeable to plan of care and goals.     Anticipated barriers to physical therapy: none stated at this time    Goals:  Short Term Goals: 3 weeks   1) Pt will be I with established HEP  (Progressing, not met)  2) Pt will perform 6 sit to stands in 30 seconds to demonstrate improved functional strength  (Progressing, not met)  3) Pt will have no worse then moderate  ROM loss  (Progressing, not met)     Long Term Goals: 6 weeks   1) Pt will walk for 30 minutes with pain no worse then 4/10 (Progressing, not met)  2) Pt will perform 30 minutes of household cleaning activities with pain no worse then 4/10  (Progressing, not met)  3) Pt will perform 8 stands in 30 seconds without using his hands  (Progressing, not met)    PLAN     Continue PT current POC to improve functional mobility.    Efren Bowers, PTA

## 2022-01-20 RX ORDER — MELOXICAM 7.5 MG/1
7.5 TABLET ORAL DAILY PRN
Qty: 30 TABLET | Refills: 0 | Status: ON HOLD | OUTPATIENT
Start: 2022-01-20 | End: 2023-08-28 | Stop reason: HOSPADM

## 2022-01-20 NOTE — TELEPHONE ENCOUNTER
----- Message from Mana Pope sent at 1/20/2022  9:49 AM CST -----  Name Of Caller: Lane     Provider Name: Sg Aceves    Does patient feel the need to be seen today?  No     Relationship to the Pt?: pt     Contact Preference?: [phone]    What is the nature of the call?:Pt called and said that every time he went to pharmacy to  medication they told him you didn't sign off on medication       meloxicam (MOBIC) 7.5 MG tablet       [id number] - [place], [place] - [address] AT [facility] [address]    Was told by them they sent messages to your office but no response either     Please call him back

## 2022-01-25 ENCOUNTER — CLINICAL SUPPORT (OUTPATIENT)
Dept: REHABILITATION | Facility: HOSPITAL | Age: 42
End: 2022-01-25
Payer: COMMERCIAL

## 2022-01-25 DIAGNOSIS — M54.50 LUMBAR PAIN: ICD-10-CM

## 2022-01-25 DIAGNOSIS — R53.1 WEAKNESS: ICD-10-CM

## 2022-01-25 DIAGNOSIS — Z74.09 IMPAIRED FUNCTIONAL MOBILITY AND ACTIVITY TOLERANCE: ICD-10-CM

## 2022-01-25 PROCEDURE — 97110 THERAPEUTIC EXERCISES: CPT | Mod: PN | Performed by: PHYSICAL THERAPIST

## 2022-01-25 NOTE — PLAN OF CARE
[facility]  Physical Therapy POC Update     Date: 1/25/2022   Name: Lane Campbell  Clinic Number: [id number]    Therapy Diagnosis:   Encounter Diagnoses   Name Primary?    Lumbar pain     Weakness     Impaired functional mobility and activity tolerance      Physician: Sg Aceves MD    Physician Orders: PT Eval and Treat   Medical Diagnosis from Referral: Lumbar disc herniation   Evaluation Date: 1/25/2022  Authorization Period Expiration: 12/31/21  Plan of Care Expiration: 03/08/2022  Progress Note Due: 2/25/22  Visit # / Visits authorized: 1/ 1   FOTO: 2/ 3     Precautions: Standard    Time In: 11:35 PM   Time Out: 12:212 PM   Total Appointment Time (timed & untimed codes): 37 minutes    Subjective   Date of onset: 11/1/21  History of current condition - Lane reports: Having back surgery on 11/1/21. He states that they removed part of a disc. Immediately following the surgery, he states that he was doing well. He still having some pain, but was moving around. About 3 weeks ago he started having more intense pain in the low back and glutes. His pain is constant in nature and worsened with increased levels of activity. He has minimal pain when he is sitting. His pain starts in his low back and radiates to his gluteals and posterior left thigh. He denies any numbness or tingling into the LE's.     Update 1/25/22: Lane states that he feels a little bit better after his therapy sessions, but the relief is short lived. He continues to have varying levels of pain. He has been unable to consistently attend physical therapy due to being exposed to Covid. He continues to report limitations with ADL activities.        Past Medical History:   Diagnosis Date    Diabetes mellitus     pre diabetic    Fatty liver     H/O bronchitis     Hypertension     Lumbar disc herniation 10/2021    Lumbar radiculopathy 10/2021     Lane Campbell  has a past surgical history that includes Fracture  "surgery; Transforaminal epidural injection of steroid (Bilateral, 1/17/2020); Epidural steroid injection into lumbar spine (N/A, 5/15/2020); Laminectomy using minimally invasive technique (N/A, 11/1/2021); minimally invasive foraminotomy of  spine (N/A, 11/1/2021); and Minimally invasive surgical removal of intervertebral disc of spine using microscope (N/A, 11/1/2021).    Lane has a current medication list which includes the following prescription(s): gabapentin, hydrocortisone-pramoxine, losartan-hydrochlorothiazide 100-25 mg, meloxicam, and oxycodone-acetaminophen.    Review of patient's allergies indicates:  No Known Allergies     Imaging: none    Prior Therapy: For back pain prior to surgery   Social History:  lives with an adult   Occupation: not currently working; was working as the  at [employer]   Prior Level of Function: Difficulty performing ADL activities; unable to work   Current Level of Function: Difficulty with household ADL's, unable to work as a , increased time with grocery shopping     Pain:  Current 6/10, worst 7/10, best 5/10   Location: left back  and posterior thigh    Description: "pulling" "nerve"   Aggravating Factors: Standing, Walking and Getting out of bed/chair  Easing Factors: hot bath and sitting down     Pts goals: To get better and return to work       Objective   Red Flag Screening:   Cough  Sneeze  Strain: (--)  Bladder/ bowel: (--)  Falls: (--)  Night pain: (--)  Unexplained weight loss: (--)  General health: Fair     Posture/ Alignment: Fair, maintains a flexed posture, unweights left LE     GAIT DEVIATIONS: Lane amb with decreased kat and decreased gait speed while maintaining a flexed posture .    ROM:   AROM  Comment   Flexion: Major loss   * pain with all AROM testing    Extension: Major loss      Lat Flex R: Major loss      Lat Flex L: Major loss      Rotation R: Major loss      Rotation L: Major loss    "   *pain      Strength: Dermatomes:   Right Left Comment   L2 (lateral thigh) intact intact    L3 (medial thigh) intact intact    L4 (medial calf) intact intact    L5 (lateral calf) intact intact    S1 (lateral foot) intact intact    S2 (gastro/HS) intact intact    Saddle (cauda equina) intact intact      Myotomes:   Right Left Comment   Iliacus: (L2-3) 4/5 4/5    Knee extension (L3-4): 4/5 4/5    DF (L4-5): 4/5 4/5    Extensor digitorum longus, Peroneus(L5-S1): 4/5 4/5    Gastroc/Soleus(S1-S2): 5/5 5/5      Hip extension: 3-/5      DTR:   Right Left Comment   Patellar (L3-4) 0 0    Achilles (S1) 0 0        Special Tests:  SLR:(+) for pain   30 sec stand test: 4x with use of hands on knees     Palpation:  TTP bilateral lumbar region     Pt/family was provided educational information, including: role of PT, goals for PT, scheduling - pt verbalized understanding. Discussed insurance plan with pt.       CMS Impairment/Limitation/Restriction for FOTO Lumbar Survey    Therapist reviewed FOTO scores for Lane Campbell on 1/25/2022.   FOTO documents entered into [employer] - see Media section.    Limitation Score: 72%  Category: Mobility    Current : CL = least 60% but < 80% impaired, limited or restricted           TREATMENT     See note section for today's visit     Home Exercises and Patient Education Provided    Education provided:   - Pathophysiology of condition   - HEP   - POC   - Need for increased activity within pain tolerance     Written Home Exercises Provided: yes.  Exercises were reviewed and Lane was able to demonstrate them prior to the end of the session.  Lane demonstrated good  understanding of the education provided.     See EMR under Patient Instructions for exercises provided 12/20/2021.      Assessment   Pt presents S/P lumbar discectomy.  He continues with limited AROM, joint mobility, strength, and pain which limits functional mobility. Pain is most limiting at this time. All movement is very slow and  guarded. He has been unable to consistently attend physical therapy, which has slowed his progress. This pt is a good candidate for continued skilled PT tx and stands to benefit from a combination of manual therapy, therapeutic exercise, neuromuscular re-education, dry needling and modalities Prn. The pt has been educated on their dx/POC and consents to further PT tx. He will also benefit from increasing his activity level at home.     Pt prognosis is Fair.   Pt will benefit from skilled outpatient Physical Therapy to address the deficits stated above and in the chart below, provide pt/family education, and to maximize pt's level of independence.     Plan of care discussed with patient: Yes  Pt's spiritual, cultural and educational needs considered and patient is agreeable to the plan of care and goals as stated below:     Anticipated Barriers for therapy: None at this time     Medical Necessity is demonstrated by the following  History  Co-morbidities and personal factors that may impact the plan of care Co-morbidities:   diabetes, high BMI, HTN and prior lumbar surgery    Personal Factors:   lifestyle     high   Examination  Body Structures and Functions, activity limitations and participation restrictions that may impact the plan of care Body Regions:   back  lower extremities    Body Systems:    gross symmetry  ROM  strength  gait  transitions  motor control    Participation Restrictions:       Activity limitations:   Learning and applying knowledge  no deficits    General Tasks and Commands  no deficits    Communication  no deficits    Mobility  lifting and carrying objects  walking    Self care  no deficits    Domestic Life  shopping  cooking  doing house work (cleaning house, washing dishes, laundry)    Interactions/Relationships  no deficits    Life Areas  employment    Community and Social Life  community life  recreation and leisure         high   Clinical Presentation evolving clinical presentation with  changing clinical characteristics moderate   Decision Making/ Complexity Score: moderate     Goals:  Short Term Goals: 3 weeks   1) Pt will be I with established HEP   2) Pt will perform 6 sit to stands in 30 seconds to demonstrate improved functional strength   3) Pt will have no worse then moderate ROM loss     Long Term Goals: 6 weeks   1) Pt will walk for 30 minutes with pain no worse then 4/10  2) Pt will perform 30 minutes of household cleaning activities with pain no worse then 4/10   3) Pt will perform 8 stands in 30 seconds without using his hands         Plan   Plan of care Certification: 1/25/2022 to 03/08/22.    Outpatient Physical Therapy 2 times weekly for 6 weeks to include the following interventions: Manual Therapy, Moist Heat/ Ice, Neuromuscular Re-ed, Patient Education and Therapeutic Exercise.     Giuseppe Cade, PT      I CERTIFY THE NEED FOR THESE SERVICES FURNISHED UNDER THIS PLAN OF TREATMENT AND WHILE UNDER MY CARE   Physician's comments:     Physician's Signature: ___________________________________________________

## 2022-01-25 NOTE — PROGRESS NOTES
[facility]   Physical Therapy Treatment Note     Name: Lane Campbell  Clinic Number: [id number]    Therapy Diagnosis:   Encounter Diagnoses   Name Primary?    Lumbar pain     Weakness     Impaired functional mobility and activity tolerance      Physician: Sg Aceves MD    Visit Date: 1/25/2022    Physician Orders: PT Eval and Treat   Medical Diagnosis from Referral: Lumbar disc herniation   Evaluation Date: 12/20/2021  Authorization Period Expiration: 12/31/22  Plan of Care Expiration: 01/31/2022  Progress Note Due: 01/20/22  Visit # / Visits authorized: 3/20 (1/ 1 - eval)   FOTO: 2/ 3   PTA Visit #: 0/5     Precautions: Standard    Time In: 1135 am  Time Out: 1212 PM  Total Appointment Time: 37 minutes    SUBJECTIVE     Pt reports: he has some relief following therapy, but its short lived     He was not compliant with home exercise program.  Response to previous treatment: initial evaluation  Functional change: too soon to tell    Pain: 6/10  Location: bilateral back (more in the glutes)    OBJECTIVE     Objective Measures updated at progress report unless specified.     Treatment     Lane received the treatments listed below:      Therapeutic exercises to develop strength, endurance, ROM, flexibility, posture and core stabilization for 37 minutes including:    Recumbent bike 5 min (Seat: 6, Level: 2)  Treadmill 3 min (Incline: 1, Speed: 0.5) - at the end of the session with pelvic tilt and upright posture - NP      Seated:  Thoracic extensions on ball 2 x10 reps  Hip abduction RTB 2 x10 reps  Hip adduction (3 sec hold) 2 x10 reps  Ball flexion stretch 2 min     Supine:  Piriformis stretch 10s x5 B  SKTC stretch with towel 5s x10 B  Hamstring stretch 10s x10 B (NP today - time)  Posterior pelvic tilts (3s hold) 2 x10 reps   Bridges 10x     Standing:   Rows 2 x 10 green theraband     Prone:  Prone on elbows 2 min    Manual therapy techniques for 00 minutes:    STM/percussion gun  to bilateral lumbar paraspinals    Possible next session: sit to stands, standing lumbar extensions, bridging     Patient Education and Home Exercises     Home Exercises Provided and Patient Education Provided     Education provided:   - Pathophysiology of condition   - HEP   - POC   - Need for increased activity within pain tolerance     Written Home Exercises Provided: yes. Exercises were reviewed and Lane was able to demonstrate them prior to the end of the session.  Lane demonstrated good  understanding of the education provided. See EMR under Patient Instructions for exercises provided during therapy sessions    ASSESSMENT     Lane continues to be significantly limited with exercise performance and functional ADL's. Pain is most limiting at this time. He would benefit from increasing his activity level at home to assist in promoting improved ADL abilities.     Lane Is progressing well towards his goals.   Pt prognosis is Fair.     Pt will continue to benefit from skilled outpatient physical therapy to address the deficits listed in the problem list box on initial evaluation, provide pt/family education and to maximize pt's level of independence in the home and community environment.     Pt's spiritual, cultural and educational needs considered and pt agreeable to plan of care and goals.     Anticipated barriers to physical therapy: none stated at this time    Goals:  Short Term Goals: 3 weeks   1) Pt will be I with established HEP  (Progressing, not met)  2) Pt will perform 6 sit to stands in 30 seconds to demonstrate improved functional strength  (Progressing, not met)  3) Pt will have no worse then moderate ROM loss  (Progressing, not met)     Long Term Goals: 6 weeks   1) Pt will walk for 30 minutes with pain no worse then 4/10 (Progressing, not met)  2) Pt will perform 30 minutes of household cleaning activities with pain no worse then 4/10  (Progressing, not met)  3) Pt will perform 8 stands in 30  seconds without using his hands  (Progressing, not met)    PLAN     Continue PT current POC to improve functional mobility.    Giuseppe Cade, PT

## 2022-01-27 ENCOUNTER — DOCUMENTATION ONLY (OUTPATIENT)
Dept: REHABILITATION | Facility: HOSPITAL | Age: 42
End: 2022-01-27
Payer: COMMERCIAL

## 2022-01-27 NOTE — PROGRESS NOTES
Patient arrived late for his appointment today, January 27th. PTA discussed with patient about how he would not get the full time but if he wanted to come later today at another time we could go through the whole session. Patient understood that he was late for his appointment and agreed he would like to be able to have the full session and decided to call back to let us know what time he would be able to come in.    Efren Bowers, PTA

## 2022-02-01 NOTE — PROGRESS NOTES
[facility]   Physical Therapy Treatment Note     Name: Lane Campbell  Clinic Number: [id number]    Therapy Diagnosis:   Encounter Diagnoses   Name Primary?    Lumbar pain     Weakness     Impaired functional mobility and activity tolerance      Physician: Sg Aceves MD    Visit Date: 2/2/2022    Physician Orders: PT Eval and Treat   Medical Diagnosis from Referral: Lumbar disc herniation   Evaluation Date: 12/20/2021  Authorization Period Expiration: 12/31/22  Plan of Care Expiration: 01/31/2022  Progress Note Due: 01/20/22  Visit # / Visits authorized: 4 / 20 (1/ 1 - eval)   FOTO: 2 / 3   PTA Visit #: 0 / 5     Precautions: Standard    Time In: 11:38 AM (pt arrived late)  Time Out: 12:16 PM  Total Appointment Time: 38 minutes    SUBJECTIVE     Pt reports: that he is hurting in the same areas, specifically in the back and buttocks. He was able to get in a hot tub and felt a lot of improvement after that for about 3 hours.     He was not compliant with home exercise program.  Response to previous treatment: mild to moderate soreness that improved over time  Functional change: no functional change at this time    Pain: 6/10  Location: bilateral back (more in the glutes)    OBJECTIVE     Objective Measures updated at progress report unless specified.     Treatment     Lane received the treatments listed below:      Therapeutic exercises to develop strength, endurance, ROM, flexibility, posture and core stabilization for 38 minutes including:    Recumbent Bike x5 min (Seat: 6, Level: 2)  Treadmill x3 min (Incline: 1, Speed: 0.5) - at the end of the session with pelvic tilt and upright posture - (NP today)    Seated:  Thoracic extensions on ball 2 x10 reps  Hip abduction RTB x2 min  Hip adduction (3 sec hold) x2 min  Ball flexion stretch x2 min   Hamstring Stretch 5x10s BLE + LE elevated on 6 in stool    Supine:  Piriformis stretch 10s x5 B  SKTC stretch with towel 5s x10 B  Hamstring  stretch 10s x10 B (NP today - time)  Posterior pelvic tilts (3s hold) 2 x10 reps   Bridges x10 reps     Standing:   Rows 2 x 10 green theraband     Prone:  Prone on elbows x2 min    Manual therapy techniques for 00 minutes:    STM/percussion gun to bilateral lumbar paraspinals      Possible next session: sit to stands, lumbar extensions    Patient Education and Home Exercises     Home Exercises Provided and Patient Education Provided     Education provided:   - Pathophysiology of condition   - HEP Review  - POC   - Need for increased activity within pain tolerance     Written Home Exercises Provided: Patient instructed to cont prior HEP. Exercises were reviewed and Lane was able to demonstrate them prior to the end of the session.  Lane demonstrated good  understanding of the education provided. See EMR under Patient Instructions for exercises provided during therapy sessions    ASSESSMENT     Patient continues to be limited with progression of treatment due to increased pain around the lower back and into the glutes. Emphasis placed on maintaining a pain free ROM with all activities. Flexion based exercises are the most tolerable position. Improved performance of pelvic tilts after visual demonstration. Plan to re-assess patient next session.    Lane Is progressing well towards his goals.   Pt prognosis is Fair.     Pt will continue to benefit from skilled outpatient physical therapy to address the deficits listed in the problem list box on initial evaluation, provide pt/family education and to maximize pt's level of independence in the home and community environment.     Pt's spiritual, cultural and educational needs considered and pt agreeable to plan of care and goals.     Anticipated barriers to physical therapy: none stated at this time    Goals:  Short Term Goals: 3 weeks   1) Pt will be I with established HEP  (Progressing, not met)  2) Pt will perform 6 sit to stands in 30 seconds to demonstrate  improved functional strength  (Progressing, not met)  3) Pt will have no worse then moderate ROM loss  (Progressing, not met)     Long Term Goals: 6 weeks   1) Pt will walk for 30 minutes with pain no worse then 4/10 (Progressing, not met)  2) Pt will perform 30 minutes of household cleaning activities with pain no worse then 4/10  (Progressing, not met)  3) Pt will perform 8 stands in 30 seconds without using his hands  (Progressing, not met)    PLAN     Continue PT current POC to improve functional mobility.    Possible next session: sit to stands, lumbar extensions    Jessica Meeks, PT, DPT, Cert. DN

## 2022-02-02 ENCOUNTER — CLINICAL SUPPORT (OUTPATIENT)
Dept: REHABILITATION | Facility: HOSPITAL | Age: 42
End: 2022-02-02
Payer: COMMERCIAL

## 2022-02-02 DIAGNOSIS — M54.50 LUMBAR PAIN: ICD-10-CM

## 2022-02-02 DIAGNOSIS — R53.1 WEAKNESS: ICD-10-CM

## 2022-02-02 DIAGNOSIS — Z74.09 IMPAIRED FUNCTIONAL MOBILITY AND ACTIVITY TOLERANCE: ICD-10-CM

## 2022-02-02 PROCEDURE — 97110 THERAPEUTIC EXERCISES: CPT | Mod: PN

## 2022-02-03 ENCOUNTER — CLINICAL SUPPORT (OUTPATIENT)
Dept: REHABILITATION | Facility: HOSPITAL | Age: 42
End: 2022-02-03
Payer: COMMERCIAL

## 2022-02-03 DIAGNOSIS — R53.1 WEAKNESS: ICD-10-CM

## 2022-02-03 DIAGNOSIS — M54.50 LUMBAR PAIN: ICD-10-CM

## 2022-02-03 DIAGNOSIS — Z74.09 IMPAIRED FUNCTIONAL MOBILITY AND ACTIVITY TOLERANCE: ICD-10-CM

## 2022-02-03 PROCEDURE — 97110 THERAPEUTIC EXERCISES: CPT | Mod: PN | Performed by: PHYSICAL THERAPIST

## 2022-02-03 NOTE — PROGRESS NOTES
[facility]   Physical Therapy Treatment Note     Name: Lane Campbell  Clinic Number: [id number]    Therapy Diagnosis:   Encounter Diagnoses   Name Primary?    Lumbar pain     Weakness     Impaired functional mobility and activity tolerance      Physician: Sg Aceves MD    Visit Date: 2/3/2022    Physician Orders: PT Eval and Treat   Medical Diagnosis from Referral: Lumbar disc herniation   Evaluation Date: 12/20/2021  Authorization Period Expiration: 12/31/22  Plan of Care Expiration: 03/08/2022  Progress Note Due: 01/20/22  Visit # / Visits authorized: 4 / 20 (1/ 1 - eval)   FOTO: 2 / 3   PTA Visit #: 0 / 5     Precautions: Standard    Time In: 7:12 AM (late starting due to patient completing FOTO)  Time Out: 7:55 PM  Total Appointment Time: 43 minutes    SUBJECTIVE     Pt reports: that he was sore after his visit yesterday. I went straight home and soaked     He was not compliant with home exercise program.  Response to previous treatment: mild to moderate soreness that improved over time  Functional change: no functional change at this time    Pain: 5/10  Location: bilateral back (more in the glutes)    OBJECTIVE     Objective Measures updated at progress report unless specified.     Treatment     Lane received the treatments listed below:      Therapeutic exercises to develop strength, endurance, ROM, flexibility, posture and core stabilization for 33 minutes including:    Recumbent Bike x5 min (Seat: 6, Level: 2)  Treadmill x3 min (Incline: 1, Speed: 0.5) - at the end of the session with pelvic tilt and upright posture - (NP today)    Seated:  Thoracic extensions on ball 2 x10 reps - NP   Hip abduction RTB x2 min - NP  Hip adduction (3 sec hold) x2 min - NP   Ball flexion stretch x2 min   Hamstring Stretch 5x10s BLE + LE elevated on 6 in stool    Supine:  Piriformis stretch 10s x5 B  SKTC stretch with towel 5s x10 B - NP  Hamstring stretch 10s x10 B (NP today -  time)  Posterior pelvic tilts (3s hold) 2 x10 reps - NP  Bridges 2x10 reps     Standing:   Rows 2 x 10 17#  Sit to stand 10x   Straight arm lat pulldowns 2 x 10 17#    Prone:  Prone on elbows x2 min     Manual therapy techniques for 00 minutes:    STM/percussion gun to bilateral lumbar paraspinals      Possible next session: shuttle, prone extension activities     Patient Education and Home Exercises     Home Exercises Provided and Patient Education Provided     Education provided:   - On standing with a more upright posture   - The need for short bouts of walking at home to promote increased function      Written Home Exercises Provided: Patient instructed to cont prior HEP. Exercises were reviewed and Lane was able to demonstrate them prior to the end of the session.  Lane demonstrated good  understanding of the education provided. See EMR under Patient Instructions for exercises provided during therapy sessions    ASSESSMENT     Lane was able to initiate an increased amount of strengthening activities without a significant increase in pain levels. He continues to be very limited by pain and move with guarded movements between positions. He will benefit from attempting to maintain a more upright posture to assist in reducing stress on the lumbar spine.     Lane Is progressing well towards his goals.     Pt prognosis is Fair.     Pt will continue to benefit from skilled outpatient physical therapy to address the deficits listed in the problem list box on initial evaluation, provide pt/family education and to maximize pt's level of independence in the home and community environment.     Pt's spiritual, cultural and educational needs considered and pt agreeable to plan of care and goals.     Anticipated barriers to physical therapy: none stated at this time    Goals:  Short Term Goals: 3 weeks   1) Pt will be I with established HEP  (Progressing, not met)  2) Pt will perform 6 sit to stands in 30 seconds to  demonstrate improved functional strength  (Progressing, not met)  3) Pt will have no worse then moderate ROM loss  (Progressing, not met)     Long Term Goals: 6 weeks   1) Pt will walk for 30 minutes with pain no worse then 4/10 (Progressing, not met)  2) Pt will perform 30 minutes of household cleaning activities with pain no worse then 4/10  (Progressing, not met)  3) Pt will perform 8 stands in 30 seconds without using his hands  (Progressing, not met)    PLAN     Continue PT current POC to improve functional mobility.    Possible next session: sit to stands, lumbar extensions    Giuseppe Cade, PT, DPT

## 2022-02-10 ENCOUNTER — OFFICE VISIT (OUTPATIENT)
Dept: NEUROSURGERY | Facility: CLINIC | Age: 42
End: 2022-02-10
Payer: COMMERCIAL

## 2022-02-10 VITALS
HEART RATE: 87 BPM | DIASTOLIC BLOOD PRESSURE: 80 MMHG | BODY MASS INDEX: 40.09 KG/M2 | WEIGHT: 280 LBS | HEIGHT: 70 IN | SYSTOLIC BLOOD PRESSURE: 123 MMHG | RESPIRATION RATE: 18 BRPM

## 2022-02-10 DIAGNOSIS — M51.26 LUMBAR DISC HERNIATION: Primary | ICD-10-CM

## 2022-02-10 DIAGNOSIS — M54.9 DORSALGIA, UNSPECIFIED: ICD-10-CM

## 2022-02-10 PROCEDURE — 3074F SYST BP LT 130 MM HG: CPT | Mod: CPTII,S$GLB,, | Performed by: STUDENT IN AN ORGANIZED HEALTH CARE EDUCATION/TRAINING PROGRAM

## 2022-02-10 PROCEDURE — 3074F PR MOST RECENT SYSTOLIC BLOOD PRESSURE < 130 MM HG: ICD-10-PCS | Mod: CPTII,S$GLB,, | Performed by: STUDENT IN AN ORGANIZED HEALTH CARE EDUCATION/TRAINING PROGRAM

## 2022-02-10 PROCEDURE — 3079F PR MOST RECENT DIASTOLIC BLOOD PRESSURE 80-89 MM HG: ICD-10-PCS | Mod: CPTII,S$GLB,, | Performed by: STUDENT IN AN ORGANIZED HEALTH CARE EDUCATION/TRAINING PROGRAM

## 2022-02-10 PROCEDURE — 3008F BODY MASS INDEX DOCD: CPT | Mod: CPTII,S$GLB,, | Performed by: STUDENT IN AN ORGANIZED HEALTH CARE EDUCATION/TRAINING PROGRAM

## 2022-02-10 PROCEDURE — 1159F PR MEDICATION LIST DOCUMENTED IN MEDICAL RECORD: ICD-10-PCS | Mod: CPTII,S$GLB,, | Performed by: STUDENT IN AN ORGANIZED HEALTH CARE EDUCATION/TRAINING PROGRAM

## 2022-02-10 PROCEDURE — 3008F PR BODY MASS INDEX (BMI) DOCUMENTED: ICD-10-PCS | Mod: CPTII,S$GLB,, | Performed by: STUDENT IN AN ORGANIZED HEALTH CARE EDUCATION/TRAINING PROGRAM

## 2022-02-10 PROCEDURE — 99213 OFFICE O/P EST LOW 20 MIN: CPT | Mod: S$GLB,,, | Performed by: STUDENT IN AN ORGANIZED HEALTH CARE EDUCATION/TRAINING PROGRAM

## 2022-02-10 PROCEDURE — 1159F MED LIST DOCD IN RCRD: CPT | Mod: CPTII,S$GLB,, | Performed by: STUDENT IN AN ORGANIZED HEALTH CARE EDUCATION/TRAINING PROGRAM

## 2022-02-10 PROCEDURE — 99213 PR OFFICE/OUTPT VISIT, EST, LEVL III, 20-29 MIN: ICD-10-PCS | Mod: S$GLB,,, | Performed by: STUDENT IN AN ORGANIZED HEALTH CARE EDUCATION/TRAINING PROGRAM

## 2022-02-10 PROCEDURE — 3079F DIAST BP 80-89 MM HG: CPT | Mod: CPTII,S$GLB,, | Performed by: STUDENT IN AN ORGANIZED HEALTH CARE EDUCATION/TRAINING PROGRAM

## 2022-02-10 RX ORDER — OXYCODONE AND ACETAMINOPHEN 7.5; 325 MG/1; MG/1
1 TABLET ORAL EVERY 6 HOURS PRN
Qty: 28 TABLET | Refills: 0 | Status: SHIPPED | OUTPATIENT
Start: 2022-02-10 | End: 2022-02-28 | Stop reason: SDUPTHER

## 2022-02-10 NOTE — PROGRESS NOTES
"REFERRING PHYSICIAN:    No ref. provider found  N/A    Postoperative visit #2.    CLINICAL PROGRESS:   Lane Campbell is now  Three months status post laminectomy and microdiskectomy at L4-5   Uncomplicated.  Severe stenosis with a large disc herniation and chronic radiculopathy  He has had improvement in his left lower extremity pain, has been working with physical therapy  However he has back pain residual symptoms he has developed right lower extremity pain radiating below the knee to the foot.  He has had some leg give out he states he has had at least 2 falls secondary  He has difficulty straightening his leg on the right  Unfortunately this has led to the unemployment.  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  He brings a chair where he goes as to sit frequently very short standing time.   He had no pre severe degeneration a very bad stenosis              LV  6 weeks s/p L4-5 left mignon MIS decompression  Much better but very disables  Uses cane assist for ambulation    Now right leg bothers him more, back pain  But feels way better than preop     Had severe stensosis    MEDICATIONS:                   List reviewed, see chart for dosing details.    ALLERGIES:       Review of patient's allergies indicates:  No Known Allergies    PHYSICAL EXAMINATION:          VITAL SIGNS:   /80 (BP Location: Right arm, Patient Position: Sitting)   Pulse 87   Resp 18   Ht 5' 10" (1.778 m)   Wt 127 kg (279 lb 15.8 oz)   BMI 40.17 kg/m²     GENERAL:  Patient is well developed, well nourished, calm, collected, and in no apparent distress.  Incision is wellhealed    NEUROLOGICAL:        Motor Strength: Moves all extremities spontaneously with good tone. No abnormal movements seen.      Strength   Deltoids Triceps Biceps Wrist Extension Wrist Flexion Hand  Interossei   Upper: R 5/5 5/5 5/5 5/5 5/5 5/5 5/5     L 5/5 5/5 5/5 5/5 5/5 5/5 5/5       Iliopsoas Quadriceps Knee  Flexion Tibialis  anterior Gastro- cnemius EHL   "   Lower: R 5/5 4-/5 5/5 4+/5 5/5 5/5       L 5/5 5/5 5/5 4+/5 5/5 4/5        DTR's: 1+ LE   Clonus: absent  Diminished sensation bilateral S1 distribution   Gait: antalgic                         No difficulty standing on toes and heels              +slr right                 .  Sensation is intact to light touch/PP.  Gait is independent but antaligic uses cane assist      No flowsheet data found.      ASSESSMENT/PLAN:  t quite disabled   Difficulty standing erect   very limtied back and leg ROM, hamstring inflexibility   Risk of chronic muscular atrophty,   Is in   rec PT for spine and joint biomechanics and anatomical ambluation    Attempting wt loss with calorie and diet restriction    He knows with severe DDD, post decompression next surgical options likey in factectomy or interbody requiring fusion    Unfortunately, he has severe back pain with radiculopathy, now right leg sensory radiculopathy leg give out falls.  Difficulty standing reasonable.  The time.  He lost employment secondary to this.  Only 41 years old with progressive symptoms post decompression I recommend repeat imaging with MRI of the lumbar spine as well as dynamic x-rays for re-evaluation.  He will continue physical therapy will review imaging with complete  I have also referred him to Pain Management for consultation he is very reasonable expectations and is motivated            Advised to call the office Iif any questions or concerns arise.    This note was partially dictated using voice recognition software, so please excuse any errors that were not corrected.

## 2022-02-17 ENCOUNTER — CLINICAL SUPPORT (OUTPATIENT)
Dept: REHABILITATION | Facility: HOSPITAL | Age: 42
End: 2022-02-17
Payer: COMMERCIAL

## 2022-02-17 DIAGNOSIS — R53.1 WEAKNESS: ICD-10-CM

## 2022-02-17 DIAGNOSIS — M54.50 LUMBAR PAIN: ICD-10-CM

## 2022-02-17 DIAGNOSIS — Z74.09 IMPAIRED FUNCTIONAL MOBILITY AND ACTIVITY TOLERANCE: ICD-10-CM

## 2022-02-17 PROCEDURE — 97110 THERAPEUTIC EXERCISES: CPT | Mod: PN | Performed by: PHYSICAL THERAPIST

## 2022-02-17 NOTE — PROGRESS NOTES
[facility]   Physical Therapy Treatment Note     Name: Lane Campbell  Clinic Number: [id number]    Therapy Diagnosis:   Encounter Diagnoses   Name Primary?    Lumbar pain     Weakness     Impaired functional mobility and activity tolerance      Physician: Sg Aceves MD    Visit Date: 2/17/2022    Physician Orders: PT Eval and Treat   Medical Diagnosis from Referral: Lumbar disc herniation   Evaluation Date: 12/20/2021  Authorization Period Expiration: 12/31/22  Plan of Care Expiration: 03/08/2022  Progress Note Due: 02/25/22  Visit # / Visits authorized: 6/ 20 (1/ 1 - eval)   FOTO: 2 / 3   PTA Visit #: 0 / 5     Precautions: Standard    Time In: 10:12 AM   Time Out: 11:00 AM  Total Appointment Time: 48 minutes    SUBJECTIVE     Pt reports: that the left side of his back is feeling better, but he is still having a lot of pain on the right side of his back. I am having an MRI tomorrow.    He was not compliant with home exercise program.  Response to previous treatment: mild to moderate soreness that improved over time  Functional change: no functional change at this time    Pain: 6/10  Location: right lumbar region (more in the glutes)    OBJECTIVE     Objective Measures updated at progress report unless specified.     Treatment     Lnae received the treatments listed below:      Therapeutic exercises to develop strength, endurance, ROM, flexibility, posture and core stabilization for 40 minutes including:    Recumbent Bike x5 min (Seat: 6, Level: 2)  Treadmill x3 min (Incline: 1, Speed: 0.5) - at the end of the session with pelvic tilt and upright posture - (NP today)    Seated:  Thoracic extensions on ball 2 x10 reps - NP   Hip abduction RTB x2 min   Hip adduction (3 sec hold) x2 min - NP   Ball flexion stretch x2 min   Hamstring Stretch 5x10s BLE + LE elevated on 6 in stool    Supine:  Piriformis stretch 30s x2 B  SKTC stretch with towel 5s x10 B - NP  Hamstring stretch 10s x10 B  (NP today - time)  Posterior pelvic tilts (3s hold) 2 x10 reps   Bridges 2x10 reps     Standing:   Rows 2 x 10 17#  Sit to stand 10x   Straight arm lat pulldowns 2 x 10 17#    Prone:  Prone on elbows x2 min - NP     Manual therapy techniques for 00 minutes:    STM/percussion gun to bilateral lumbar paraspinals    Moist heat x 8 minutes to lumbar spine to end session    Possible next session: shuttle, prone extension activities     Patient Education and Home Exercises     Home Exercises Provided and Patient Education Provided     Education provided:   - On standing with a more upright posture   - The need for short bouts of walking at home to promote increased function      Written Home Exercises Provided: Patient instructed to cont prior HEP. Exercises were reviewed and Lane was able to demonstrate them prior to the end of the session.  Lane demonstrated good  understanding of the education provided. See EMR under Patient Instructions for exercises provided during therapy sessions    ASSESSMENT     Lane continues to maintain a flexed posture and move with very guarded movements. Pain continues to limit further exercise progression and performance of ADL activities.     Lane Is progressing well towards his goals.     Pt prognosis is Fair.     Pt will continue to benefit from skilled outpatient physical therapy to address the deficits listed in the problem list box on initial evaluation, provide pt/family education and to maximize pt's level of independence in the home and community environment.     Pt's spiritual, cultural and educational needs considered and pt agreeable to plan of care and goals.     Anticipated barriers to physical therapy: none stated at this time    Goals:  Short Term Goals: 3 weeks   1) Pt will be I with established HEP  (Progressing, not met)  2) Pt will perform 6 sit to stands in 30 seconds to demonstrate improved functional strength  (Progressing, not met)  3) Pt will have no worse  then moderate ROM loss  (Progressing, not met)     Long Term Goals: 6 weeks   1) Pt will walk for 30 minutes with pain no worse then 4/10 (Progressing, not met)  2) Pt will perform 30 minutes of household cleaning activities with pain no worse then 4/10  (Progressing, not met)  3) Pt will perform 8 stands in 30 seconds without using his hands  (Progressing, not met)    PLAN     Continue PT current POC to improve functional mobility.    Possible next session: sit to stands, lumbar extensions    Giuseppe Cade, PT, DPT

## 2022-02-18 ENCOUNTER — TELEPHONE (OUTPATIENT)
Dept: NEUROSURGERY | Facility: CLINIC | Age: 42
End: 2022-02-18

## 2022-02-18 RX ORDER — DIAZEPAM 5 MG/1
5 TABLET ORAL ONCE AS NEEDED
Qty: 1 TABLET | Refills: 0 | Status: SHIPPED | OUTPATIENT
Start: 2022-02-18 | End: 2022-02-18

## 2022-02-18 NOTE — TELEPHONE ENCOUNTER
----- Message from Mariajose Hendricks sent at 2/18/2022 10:34 AM CST -----  Regarding: Reschedule Orders with Rx Advice  Contact: patient  Type: Needs Medical Advice  Who Called:  Patient  Pharmacy name and phone #:    [id number] [place] [facility] [address] AT [facility] [address]  [address] [facility] [address]  Phone: [phone] Fax: [phone]  Best Call Back Number: [phone] (home)   Additional Information: Patient states he went this morning for his MRI and freaked out. He states he was not able to go through with the imaging and would like to reschedule with a prescription to help calm his nerves. In addition patient states that his job is in need documentation from the office showing his status. Please contact the patient to advise and discuss. Thanks!    .

## 2022-02-28 ENCOUNTER — PATIENT MESSAGE (OUTPATIENT)
Dept: NEUROSURGERY | Facility: CLINIC | Age: 42
End: 2022-02-28

## 2022-03-10 ENCOUNTER — TELEPHONE (OUTPATIENT)
Dept: NEUROSURGERY | Facility: CLINIC | Age: 42
End: 2022-03-10

## 2022-03-10 ENCOUNTER — PATIENT MESSAGE (OUTPATIENT)
Dept: NEUROSURGERY | Facility: CLINIC | Age: 42
End: 2022-03-10

## 2022-03-14 DIAGNOSIS — M51.26 LUMBAR DISC HERNIATION: Primary | ICD-10-CM

## 2022-03-24 ENCOUNTER — TELEPHONE (OUTPATIENT)
Dept: PAIN MEDICINE | Facility: CLINIC | Age: 42
End: 2022-03-24

## 2022-03-24 NOTE — TELEPHONE ENCOUNTER
----- Message from Mariajose Hendricks sent at 3/24/2022  4:53 PM CDT -----  Regarding: Appointment Request  Contact: patient  Type: Appointment Request    Name of Caller:  Patient  When is the first available appointment?  Unable to generate  Symptoms:  Lumbar disc herniation  Best Call Back Number:  [phone] (home)   Additional Information:  Please contact the patient to schedule. Thanks!

## 2022-03-30 ENCOUNTER — PATIENT MESSAGE (OUTPATIENT)
Dept: NEUROSURGERY | Facility: CLINIC | Age: 42
End: 2022-03-30

## 2022-04-01 ENCOUNTER — CLINICAL SUPPORT (OUTPATIENT)
Dept: REHABILITATION | Facility: HOSPITAL | Age: 42
End: 2022-04-01

## 2022-04-01 DIAGNOSIS — M54.50 LUMBAR PAIN: Primary | ICD-10-CM

## 2022-04-01 DIAGNOSIS — R53.1 WEAKNESS: ICD-10-CM

## 2022-04-01 DIAGNOSIS — Z74.09 IMPAIRED FUNCTIONAL MOBILITY AND ACTIVITY TOLERANCE: ICD-10-CM

## 2022-04-01 PROCEDURE — 97110 THERAPEUTIC EXERCISES: CPT | Mod: PN | Performed by: PHYSICAL THERAPIST

## 2022-04-01 NOTE — PROGRESS NOTES
[facility]   Physical Therapy Treatment Note     Name: Lane Campbell  Clinic Number: [id number]    Therapy Diagnosis:   Encounter Diagnoses   Name Primary?    Lumbar pain Yes    Weakness     Impaired functional mobility and activity tolerance      Physician: Sg Aceves MD    Visit Date: 4/1/2022    Physician Orders: PT Eval and Treat   Medical Diagnosis from Referral: Lumbar disc herniation   Evaluation Date: 12/20/2021  Authorization Period Expiration: 12/31/22  Plan of Care Expiration: 05/13/2022  Progress Note Due: 05/01/22  Visit # / Visits authorized: 7/ 20 (1/ 1 - eval)   FOTO: 2 / 3   PTA Visit #: 0 / 5     Precautions: Standard    Time In: 10:17 AM   Time Out: 11:11 AM  Total Appointment Time: 43 minutes    SUBJECTIVE     Pt reports: that the left side of his back is feeling better, but he is still having a lot of pain on the right side of his back. I am having an MRI tomorrow.    He was not compliant with home exercise program.  Response to previous treatment: mild to moderate soreness that improved over time  Functional change: no functional change at this time    Pain: 5/10  Location: right lumbar region (more in the glutes)    OBJECTIVE     Objective Measures updated at progress report unless specified.     Treatment     Lane received the treatments listed below:      Therapeutic exercises to develop strength, endurance, ROM, flexibility, posture and core stabilization for 43 minutes including:    Recumbent Bike x5 min (Seat: 6, Level: 2) - NP   Treadmill x3 min (Incline: 1, Speed: 0.5) - at the end of the session with pelvic tilt and upright posture - (NP today)    Seated:  Thoracic extensions on ball 2 x10 reps   Hip abduction RTB x2 min   Hip adduction (3 sec hold) x2 min    Ball flexion stretch x2 min - NP   Hamstring Stretch 5x10s BLE + LE elevated on 6 in stool - NP    Supine:  Piriformis stretch 30s x2 B  SKTC stretch with towel 5s x10 B - NP  Hamstring stretch  10s x10 B (NP today - time)  Posterior pelvic tilts (3s hold) 2 x10 reps   Bridges 2x10 reps     Standing:   Rows 2 x 10 17#  Sit to stand 5x   Straight arm lat pulldowns 2 x 10 17#    Prone:  Prone on elbows x2 min     Manual therapy techniques for 00 minutes:    STM/percussion gun to bilateral lumbar paraspinals    Moist heat x 10 minutes to lumbar spine to end session    Possible next session: shuttle, prone extension activities     Patient Education and Home Exercises     Home Exercises Provided and Patient Education Provided     Education provided:   - On standing with a more upright posture   - The need for short bouts of walking at home to promote increased function      Written Home Exercises Provided: Patient instructed to cont prior HEP. Exercises were reviewed and Lane was able to demonstrate them prior to the end of the session.  Lane demonstrated good  understanding of the education provided. See EMR under Patient Instructions for exercises provided during therapy sessions    ASSESSMENT     Lane was able to perform all requested exercises without a significant increase in pain levels. He will benefit from progression as able to assist in maximizing function. Pain remains most limiting.     Lane Is progressing well towards his goals.     Pt prognosis is Fair.     Pt will continue to benefit from skilled outpatient physical therapy to address the deficits listed in the problem list box on initial evaluation, provide pt/family education and to maximize pt's level of independence in the home and community environment.     Pt's spiritual, cultural and educational needs considered and pt agreeable to plan of care and goals.     Anticipated barriers to physical therapy: none stated at this time    Goals:  Short Term Goals: 3 weeks   1) Pt will be I with established HEP  (Progressing, not met)  2) Pt will perform 6 sit to stands in 30 seconds to demonstrate improved functional strength  (Progressing,  not met)  3) Pt will have no worse then moderate ROM loss  (Progressing, not met)     Long Term Goals: 6 weeks   1) Pt will walk for 30 minutes with pain no worse then 4/10 (Progressing, not met)  2) Pt will perform 30 minutes of household cleaning activities with pain no worse then 4/10  (Progressing, not met)  3) Pt will perform 8 stands in 30 seconds without using his hands  (Progressing, not met)    PLAN     Continue PT current POC to improve functional mobility.      Giuseppe Cade, PT, DPT

## 2022-04-01 NOTE — PLAN OF CARE
[facility]  Physical Therapy POC Update     Date: 4/1/2022   Name: Lane Campbell  Clinic Number: [id number]    Therapy Diagnosis:   Encounter Diagnoses   Name Primary?    Lumbar pain Yes    Weakness     Impaired functional mobility and activity tolerance      Physician: Sg Acevse MD    Physician Orders: PT Eval and Treat   Medical Diagnosis from Referral: Lumbar disc herniation   Evaluation Date: 4/1/2022  Authorization Period Expiration: 12/31/21  Plan of Care Expiration: 05/23/2022  Progress Note Due: 5/01/22  Visit # / Visits authorized: 1/ 1   FOTO: 3/ 3     Precautions: Standard    Time In: 10:17 AM   Time Out: 11:11 AM   Total Appointment Time (timed & untimed codes): 54 minutes    Subjective   Date of onset: 11/1/21  History of current condition - Lane reports: Having back surgery on 11/1/21. He states that they removed part of a disc. Immediately following the surgery, he states that he was doing well. He still having some pain, but was moving around. About 3 weeks ago he started having more intense pain in the low back and glutes. His pain is constant in nature and worsened with increased levels of activity. He has minimal pain when he is sitting. His pain starts in his low back and radiates to his gluteals and posterior left thigh. He denies any numbness or tingling into the LE's.     Update 4/01/22: Lane states that he continues to have pain that is on the right side of his back. He reports continued limitations with ADL activities. He is also still out of work. He also reports a pain that radiates down his right leg to the ankle. There is also numbness/tingling associated with this. This is intermittent in nature.        Past Medical History:   Diagnosis Date    Diabetes mellitus     pre diabetic    Fatty liver     H/O bronchitis     Hypertension     Lumbar disc herniation 10/2021    Lumbar radiculopathy 10/2021     Lane Campbell  has a past surgical history  "that includes Fracture surgery; Transforaminal epidural injection of steroid (Bilateral, 1/17/2020); Epidural steroid injection into lumbar spine (N/A, 5/15/2020); Laminectomy using minimally invasive technique (N/A, 11/1/2021); minimally invasive foraminotomy of  spine (N/A, 11/1/2021); and Minimally invasive surgical removal of intervertebral disc of spine using microscope (N/A, 11/1/2021).    Lane has a current medication list which includes the following prescription(s): diazepam, gabapentin, hydrocortisone-pramoxine, losartan-hydrochlorothiazide 100-25 mg, meloxicam, and oxycodone-acetaminophen.    Review of patient's allergies indicates:  No Known Allergies     Imaging: none    Prior Therapy: For back pain prior to surgery   Social History:  lives with an adult   Occupation: not currently working; was working as the  at [employer]   Prior Level of Function: Difficulty performing ADL activities; unable to work   Current Level of Function: Difficulty with household ADL's, unable to work as a , increased time with grocery shopping     Pain:  Current 5/10, worst 7/10, best 4/10   Location: left back  and posterior thigh    Description: "pulling" "nerve"   Aggravating Factors: Standing, Walking and Getting out of bed/chair  Easing Factors: hot bath and sitting down     Pts goals: To get better and return to work       Objective   Red Flag Screening:   Cough  Sneeze  Strain: (--)  Bladder/ bowel: (--)  Falls: (--)  Night pain: (--)  Unexplained weight loss: (--)  General health: Fair     Posture/ Alignment: Fair, maintains a flexed posture, unweights left LE     GAIT DEVIATIONS: Lane amb with decreased kat and decreased gait speed while maintaining a flexed posture .    ROM:   AROM  Comment   Flexion: Moderate loss   *     Extension: Major loss   *   Lat Flex R: Major loss   *   Lat Flex L: Major loss   *   Rotation R: Moderate loss      Rotation L: Moderate loss    "   *pain  - flexion more painful then extension    Strength: Dermatomes:   Right Left Comment   L2 (lateral thigh) intact intact    L3 (medial thigh) intact intact    L4 (medial calf) intact intact    L5 (lateral calf) intact intact    S1 (lateral foot) intact intact    S2 (gastro/HS) intact intact    Saddle (cauda equina) intact intact      Myotomes:   Right Left Comment   Iliacus: (L2-3) 4/5 4/5    Knee extension (L3-4): 4/5 4/5    DF (L4-5): 4/5 4/5    Extensor digitorum longus, Peroneus(L5-S1): 4/5 4/5    Gastroc/Soleus(S1-S2): 5/5 5/5      Hip extension: 3-/5      DTR:   Right Left Comment   Patellar (L3-4) 0 0    Achilles (S1) 0 0        Special Tests:  SLR:(+) for pain   30 sec stand test: 3x with use of hands on knees     Palpation:  TTP bilateral lumbar region     Pt/family was provided educational information, including: role of PT, goals for PT, scheduling - pt verbalized understanding. Discussed insurance plan with pt.       CMS Impairment/Limitation/Restriction for FOTO Lumbar Survey    Therapist reviewed FOTO scores for Lane Campbell on 4/1/2022.   FOTO documents entered into [employer] - see Media section.    Limitation Score: 56%  Category: Mobility    Current : CK = at least 40% but < 60% impaired, limited or restricted           TREATMENT     See note section for today's visit     Home Exercises and Patient Education Provided    Education provided:   - Pathophysiology of condition   - HEP   - POC   - Need for increased activity within pain tolerance     Written Home Exercises Provided: yes.  Exercises were reviewed and Lane was able to demonstrate them prior to the end of the session.  Lane demonstrated good  understanding of the education provided.     See EMR under Patient Instructions for exercises provided 12/20/2021.      Assessment   Pt presents S/P lumbar discectomy.  He continues with limited AROM, joint mobility, strength, and pain which limits functional mobility. Pain is most limiting at  this time. All movement is very slow and guarded. He has been unable to consistently attend physical therapy, which has slowed his progress. This pt is a good candidate for continued skilled PT tx and stands to benefit from a combination of manual therapy, therapeutic exercise, neuromuscular re-education, dry needling and modalities Prn. The pt has been educated on their dx/POC and consents to further PT tx. The focus will be on improvement of function. I don't expect a significant reduction in pain with physical therapy. Pain management may be beneficial in conjunction with physical therapy.     Pt prognosis is Fair.   Pt will benefit from skilled outpatient Physical Therapy to address the deficits stated above and in the chart below, provide pt/family education, and to maximize pt's level of independence.     Plan of care discussed with patient: Yes  Pt's spiritual, cultural and educational needs considered and patient is agreeable to the plan of care and goals as stated below:     Anticipated Barriers for therapy: None at this time     Medical Necessity is demonstrated by the following  History  Co-morbidities and personal factors that may impact the plan of care Co-morbidities:   diabetes, high BMI, HTN and prior lumbar surgery    Personal Factors:   lifestyle     high   Examination  Body Structures and Functions, activity limitations and participation restrictions that may impact the plan of care Body Regions:   back  lower extremities    Body Systems:    gross symmetry  ROM  strength  gait  transitions  motor control    Participation Restrictions:       Activity limitations:   Learning and applying knowledge  no deficits    General Tasks and Commands  no deficits    Communication  no deficits    Mobility  lifting and carrying objects  walking    Self care  no deficits    Domestic Life  shopping  cooking  doing house work (cleaning house, washing dishes, laundry)    Interactions/Relationships  no  deficits    Life Areas  employment    Community and Social Life  community life  recreation and leisure         high   Clinical Presentation evolving clinical presentation with changing clinical characteristics moderate   Decision Making/ Complexity Score: moderate     Goals:  Short Term Goals: 3 weeks   1) Pt will be I with established HEP   2) Pt will perform 6 sit to stands in 30 seconds to demonstrate improved functional strength - progressing, not met   3) Pt will have no worse then moderate ROM loss - progressing, not met     Long Term Goals: 6 weeks   1) Pt will walk for 30 minutes with pain no worse then 4/10 - progressing, not met   2) Pt will perform 30 minutes of household cleaning activities with pain no worse then 4/10 - progressing, not met  3) Pt will perform 8 stands in 30 seconds without using his hands - progressing, not met         Plan   Plan of care Certification: 4/1/2022 to 05/13/22.    Outpatient Physical Therapy 2 times weekly for 6 weeks to include the following interventions: Manual Therapy, Moist Heat/ Ice, Neuromuscular Re-ed, Patient Education and Therapeutic Exercise.     Giuseppe Cade, PT      I CERTIFY THE NEED FOR THESE SERVICES FURNISHED UNDER THIS PLAN OF TREATMENT AND WHILE UNDER MY CARE   Physician's comments:     Physician's Signature: ___________________________________________________

## 2022-04-21 ENCOUNTER — TELEPHONE (OUTPATIENT)
Dept: NEUROSURGERY | Facility: CLINIC | Age: 42
End: 2022-04-21

## 2022-07-07 ENCOUNTER — TELEPHONE (OUTPATIENT)
Dept: NEUROSURGERY | Facility: CLINIC | Age: 42
End: 2022-07-07
Payer: COMMERCIAL

## 2022-07-07 NOTE — TELEPHONE ENCOUNTER
----- Message from Karel Lozano sent at 2022 10:46 AM CDT -----  Regardinnd msg to schedule appt for Dr Aceves  Type:  Sooner Appointment Request    Caller is requesting a sooner appointment.    Name of Caller:  Lane    When is the first available appointment?  Dept Book    Symptoms:  Back Pain has increased. Pt spends a lot of time on floor    Best Call Back Number:  [phone]     Additional Information:  please call to schedule as soon as possible. Pt has coverage on chart.

## 2022-07-28 ENCOUNTER — OFFICE VISIT (OUTPATIENT)
Dept: NEUROSURGERY | Facility: CLINIC | Age: 42
End: 2022-07-28
Payer: COMMERCIAL

## 2022-07-28 VITALS
HEIGHT: 71 IN | RESPIRATION RATE: 18 BRPM | WEIGHT: 281.06 LBS | SYSTOLIC BLOOD PRESSURE: 153 MMHG | HEART RATE: 71 BPM | DIASTOLIC BLOOD PRESSURE: 97 MMHG | BODY MASS INDEX: 39.35 KG/M2

## 2022-07-28 DIAGNOSIS — M51.26 LUMBAR DISC HERNIATION: Primary | ICD-10-CM

## 2022-07-28 PROCEDURE — 99213 OFFICE O/P EST LOW 20 MIN: CPT | Mod: S$GLB,,, | Performed by: STUDENT IN AN ORGANIZED HEALTH CARE EDUCATION/TRAINING PROGRAM

## 2022-07-28 PROCEDURE — 3080F PR MOST RECENT DIASTOLIC BLOOD PRESSURE >= 90 MM HG: ICD-10-PCS | Mod: CPTII,S$GLB,, | Performed by: STUDENT IN AN ORGANIZED HEALTH CARE EDUCATION/TRAINING PROGRAM

## 2022-07-28 PROCEDURE — 3077F PR MOST RECENT SYSTOLIC BLOOD PRESSURE >= 140 MM HG: ICD-10-PCS | Mod: CPTII,S$GLB,, | Performed by: STUDENT IN AN ORGANIZED HEALTH CARE EDUCATION/TRAINING PROGRAM

## 2022-07-28 PROCEDURE — 3008F BODY MASS INDEX DOCD: CPT | Mod: CPTII,S$GLB,, | Performed by: STUDENT IN AN ORGANIZED HEALTH CARE EDUCATION/TRAINING PROGRAM

## 2022-07-28 PROCEDURE — 99213 PR OFFICE/OUTPT VISIT, EST, LEVL III, 20-29 MIN: ICD-10-PCS | Mod: S$GLB,,, | Performed by: STUDENT IN AN ORGANIZED HEALTH CARE EDUCATION/TRAINING PROGRAM

## 2022-07-28 PROCEDURE — 3008F PR BODY MASS INDEX (BMI) DOCUMENTED: ICD-10-PCS | Mod: CPTII,S$GLB,, | Performed by: STUDENT IN AN ORGANIZED HEALTH CARE EDUCATION/TRAINING PROGRAM

## 2022-07-28 PROCEDURE — 1159F PR MEDICATION LIST DOCUMENTED IN MEDICAL RECORD: ICD-10-PCS | Mod: CPTII,S$GLB,, | Performed by: STUDENT IN AN ORGANIZED HEALTH CARE EDUCATION/TRAINING PROGRAM

## 2022-07-28 PROCEDURE — 3080F DIAST BP >= 90 MM HG: CPT | Mod: CPTII,S$GLB,, | Performed by: STUDENT IN AN ORGANIZED HEALTH CARE EDUCATION/TRAINING PROGRAM

## 2022-07-28 PROCEDURE — 1159F MED LIST DOCD IN RCRD: CPT | Mod: CPTII,S$GLB,, | Performed by: STUDENT IN AN ORGANIZED HEALTH CARE EDUCATION/TRAINING PROGRAM

## 2022-07-28 PROCEDURE — 3077F SYST BP >= 140 MM HG: CPT | Mod: CPTII,S$GLB,, | Performed by: STUDENT IN AN ORGANIZED HEALTH CARE EDUCATION/TRAINING PROGRAM
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
## 2022-08-05 ENCOUNTER — TELEPHONE (OUTPATIENT)
Dept: NEUROSURGERY | Facility: CLINIC | Age: 42
End: 2022-08-05
Payer: COMMERCIAL

## 2022-08-05 NOTE — TELEPHONE ENCOUNTER
----- Message from Mariajose Ruthie sent at 8/3/2022  2:31 PM CDT -----  Regarding: Order Request  Contact: patient  Type: Needs Medical Advice  Who Called:  Patient  Best Call Back Number: [phone] (home)   Additional Information: Patient is requesting his therapy orders to include water therapy. He states he is near [place], [place] and would like to go to a facility near him if at all possible. Please contact the patient to advise and discuss. Thanks!

## 2023-01-06 ENCOUNTER — TELEPHONE (OUTPATIENT)
Dept: NEUROSURGERY | Facility: CLINIC | Age: 43
End: 2023-01-06
Payer: COMMERCIAL

## 2023-01-06 NOTE — TELEPHONE ENCOUNTER
----- Message from Ed Cabrera sent at 1/5/2023 12:16 PM CST -----  Regarding: appt EP  Contact: LANE RECINOS [[id number]]  Type:  Sooner Appointment Request    Caller is requesting a sooner appointment.      Name of Caller:  Lane    When is the first available appointment?  Dept Book    Symptoms:  f/u    Best Call Back Number:  [phone]     Additional Information:  na

## 2023-02-09 ENCOUNTER — OFFICE VISIT (OUTPATIENT)
Dept: NEUROSURGERY | Facility: CLINIC | Age: 43
End: 2023-02-09
Payer: COMMERCIAL

## 2023-02-09 ENCOUNTER — TELEPHONE (OUTPATIENT)
Dept: PAIN MEDICINE | Facility: CLINIC | Age: 43
End: 2023-02-09

## 2023-02-09 VITALS
HEART RATE: 77 BPM | DIASTOLIC BLOOD PRESSURE: 94 MMHG | BODY MASS INDEX: 39.34 KG/M2 | HEIGHT: 71 IN | SYSTOLIC BLOOD PRESSURE: 143 MMHG | WEIGHT: 281 LBS | RESPIRATION RATE: 18 BRPM

## 2023-02-09 DIAGNOSIS — M47.819 FACET ARTHROPATHY OF SPINE: ICD-10-CM

## 2023-02-09 DIAGNOSIS — M51.36 DDD (DEGENERATIVE DISC DISEASE), LUMBAR: ICD-10-CM

## 2023-02-09 DIAGNOSIS — Z98.890 HISTORY OF LUMBAR LAMINECTOMY FOR SPINAL CORD DECOMPRESSION: ICD-10-CM

## 2023-02-09 DIAGNOSIS — M51.26 LUMBAR DISC HERNIATION: Primary | ICD-10-CM

## 2023-02-09 PROCEDURE — 3008F BODY MASS INDEX DOCD: CPT | Mod: CPTII,S$GLB,, | Performed by: STUDENT IN AN ORGANIZED HEALTH CARE EDUCATION/TRAINING PROGRAM

## 2023-02-09 PROCEDURE — 3008F PR BODY MASS INDEX (BMI) DOCUMENTED: ICD-10-PCS | Mod: CPTII,S$GLB,, | Performed by: STUDENT IN AN ORGANIZED HEALTH CARE EDUCATION/TRAINING PROGRAM

## 2023-02-09 PROCEDURE — 99213 PR OFFICE/OUTPT VISIT, EST, LEVL III, 20-29 MIN: ICD-10-PCS | Mod: S$GLB,,, | Performed by: STUDENT IN AN ORGANIZED HEALTH CARE EDUCATION/TRAINING PROGRAM

## 2023-02-09 PROCEDURE — 3077F SYST BP >= 140 MM HG: CPT | Mod: CPTII,S$GLB,, | Performed by: STUDENT IN AN ORGANIZED HEALTH CARE EDUCATION/TRAINING PROGRAM

## 2023-02-09 PROCEDURE — 1159F MED LIST DOCD IN RCRD: CPT | Mod: CPTII,S$GLB,, | Performed by: STUDENT IN AN ORGANIZED HEALTH CARE EDUCATION/TRAINING PROGRAM

## 2023-02-09 PROCEDURE — 3080F DIAST BP >= 90 MM HG: CPT | Mod: CPTII,S$GLB,, | Performed by: STUDENT IN AN ORGANIZED HEALTH CARE EDUCATION/TRAINING PROGRAM

## 2023-02-09 PROCEDURE — 3080F PR MOST RECENT DIASTOLIC BLOOD PRESSURE >= 90 MM HG: ICD-10-PCS | Mod: CPTII,S$GLB,, | Performed by: STUDENT IN AN ORGANIZED HEALTH CARE EDUCATION/TRAINING PROGRAM

## 2023-02-09 PROCEDURE — 3077F PR MOST RECENT SYSTOLIC BLOOD PRESSURE >= 140 MM HG: ICD-10-PCS | Mod: CPTII,S$GLB,, | Performed by: STUDENT IN AN ORGANIZED HEALTH CARE EDUCATION/TRAINING PROGRAM

## 2023-02-09 PROCEDURE — 99213 OFFICE O/P EST LOW 20 MIN: CPT | Mod: S$GLB,,, | Performed by: STUDENT IN AN ORGANIZED HEALTH CARE EDUCATION/TRAINING PROGRAM

## 2023-02-09 PROCEDURE — 1159F PR MEDICATION LIST DOCUMENTED IN MEDICAL RECORD: ICD-10-PCS | Mod: CPTII,S$GLB,, | Performed by: STUDENT IN AN ORGANIZED HEALTH CARE EDUCATION/TRAINING PROGRAM
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
## 2023-02-09 NOTE — TELEPHONE ENCOUNTER
Ordering User:FRANCESCO LANG [[id number]]   Encounter Provider:Sg Aceves MD [[id number]]   Authorizing Provider: Sg Aceves MD [[id number]]   Department:[facility][[id number]]      Common Order Information   Procedure -> Medial Branch Block (Specify level and laterality) Cmt: Left L4-5      Order Specific Information   Order: Procedure Order to Pain Management [Custom: [id number]]  Order #:           [id number]: 1 FUTURE     Priority: Routine  Class: Clinic Performed     Future Order Information       Expires on:   02/09/2024            Expected by:02/09/2023                    Associated Diagnoses       M51.26 Lumbar disc herniation       Facility Name: -> [facility]               Priority: Routine  Class: Clinic Performed     Future Order Information       Expires on:02/09/2024            Expected by:02/09/2023                    Associated Diagnoses       M51.26 Lumbar disc herniation       Procedure -> Medial Branch Block (Speci   fy level and laterality) Cmt: Left                  L4-5          Facility Name: -> [facility]

## 2023-02-10 NOTE — TELEPHONE ENCOUNTER
Physician - Dr Rodriguez    Type of Procedure/Injection - Lumbar Medial Branch Block  L4/5           Laterality - Left      Type of Sedation - RNIV      Need to hold medication - no      N/A      Clearance needed - no      Follow up - phone call next day

## 2023-08-27 PROBLEM — I10 HTN (HYPERTENSION): Status: ACTIVE | Noted: 2023-08-27

## 2023-08-27 PROBLEM — R94.31 ABNORMAL EKG: Status: ACTIVE | Noted: 2023-08-27

## 2023-08-27 PROBLEM — N17.9 AKI (ACUTE KIDNEY INJURY): Status: ACTIVE | Noted: 2023-08-27

## 2023-08-28 PROBLEM — R07.9 CHEST PAIN: Status: ACTIVE | Noted: 2023-08-28

## 2023-08-28 PROBLEM — B34.9 VIRAL ILLNESS: Status: ACTIVE | Noted: 2023-08-28

## 2023-08-28 PROBLEM — I25.10 CORONARY ARTERY DISEASE: Status: ACTIVE | Noted: 2023-08-28

## 2023-08-28 PROBLEM — R42 DIZZINESS: Status: ACTIVE | Noted: 2023-08-28

## 2023-11-27 PROBLEM — N17.9 AKI (ACUTE KIDNEY INJURY): Status: RESOLVED | Noted: 2023-08-27 | Resolved: 2023-11-27

## 2024-11-06 ENCOUNTER — TELEPHONE (OUTPATIENT)
Dept: NEUROSURGERY | Facility: CLINIC | Age: 44
End: 2024-11-06

## 2024-11-06 NOTE — TELEPHONE ENCOUNTER
Unable to complete call. Not in service     ----- Message from Rosita sent at 11/6/2024 12:00 PM CST -----  Contact: pt  Type:  Sooner Apoointment Request    Caller is requesting a sooner appointment.  Caller declined first available appointment listed below.  Caller will not accept being placed on the waitlist and is requesting a message be sent to doctor.    Name of Caller: pt    When is the first available appointment? Department book    Symptoms: pain where surgery was done    Would the patient rather a call back or a response via [facility]?  Call back    Best Call Back Number:[phone]    Additional Information: please call to advise    Thanks

## 2025-07-25 NOTE — TELEPHONE ENCOUNTER
Chronic, unknown control  Is taking vitamin D 5000 units daily  -Will recheck level  Orders:  •  Vitamin D 25 hydroxy; Future   Pt called trying to schedule an appt with pain management. He has been playing phone-tag with them and doesn't have voicemail setup. Please schedule pt appt any day except Wednesday after 2:00pm. Pt prefers Friday appts if possible. Pt asks that an appt be booked, then call him or message neurosurgery to alert of appt. Thank you!

## (undated) DEVICE — GLOVE SURGICAL LATEX SZ 7

## (undated) DEVICE — TRAY NERVE BLOCK

## (undated) DEVICE — SEE MEDLINE ITEM 152622

## (undated) DEVICE — NDL TUOHY EPIDURAL 20G X 3.5

## (undated) DEVICE — MARKER SKIN STND TIP BLUE BARR

## (undated) DEVICE — APPLICATOR CHLORAPREP CLR 10.5

## (undated) DEVICE — NDL SPINAL SPINOCAN 22GX3.5

## (undated) DEVICE — SYR GLASS 5CC LUER LOK